# Patient Record
Sex: FEMALE | Race: WHITE | Employment: OTHER | RURAL
[De-identification: names, ages, dates, MRNs, and addresses within clinical notes are randomized per-mention and may not be internally consistent; named-entity substitution may affect disease eponyms.]

---

## 2018-01-05 ENCOUNTER — OFFICE VISIT (OUTPATIENT)
Dept: PRIMARY CARE CLINIC | Age: 78
End: 2018-01-05
Payer: MEDICARE

## 2018-01-05 VITALS
SYSTOLIC BLOOD PRESSURE: 110 MMHG | HEIGHT: 67 IN | HEART RATE: 75 BPM | BODY MASS INDEX: 33.68 KG/M2 | WEIGHT: 214.6 LBS | OXYGEN SATURATION: 97 % | DIASTOLIC BLOOD PRESSURE: 70 MMHG

## 2018-01-05 DIAGNOSIS — J40 BRONCHITIS: Primary | ICD-10-CM

## 2018-01-05 PROCEDURE — 1090F PRES/ABSN URINE INCON ASSESS: CPT | Performed by: NURSE PRACTITIONER

## 2018-01-05 PROCEDURE — 4040F PNEUMOC VAC/ADMIN/RCVD: CPT | Performed by: NURSE PRACTITIONER

## 2018-01-05 PROCEDURE — G8400 PT W/DXA NO RESULTS DOC: HCPCS | Performed by: NURSE PRACTITIONER

## 2018-01-05 PROCEDURE — 1123F ACP DISCUSS/DSCN MKR DOCD: CPT | Performed by: NURSE PRACTITIONER

## 2018-01-05 PROCEDURE — G8427 DOCREV CUR MEDS BY ELIG CLIN: HCPCS | Performed by: NURSE PRACTITIONER

## 2018-01-05 PROCEDURE — 99213 OFFICE O/P EST LOW 20 MIN: CPT | Performed by: NURSE PRACTITIONER

## 2018-01-05 PROCEDURE — G8484 FLU IMMUNIZE NO ADMIN: HCPCS | Performed by: NURSE PRACTITIONER

## 2018-01-05 PROCEDURE — G8417 CALC BMI ABV UP PARAM F/U: HCPCS | Performed by: NURSE PRACTITIONER

## 2018-01-05 PROCEDURE — 1036F TOBACCO NON-USER: CPT | Performed by: NURSE PRACTITIONER

## 2018-01-05 RX ORDER — AZITHROMYCIN 250 MG/1
TABLET, FILM COATED ORAL
Qty: 1 PACKET | Refills: 1 | Status: ON HOLD | OUTPATIENT
Start: 2018-01-05 | End: 2020-06-30

## 2018-01-05 ASSESSMENT — ENCOUNTER SYMPTOMS
SINUS PRESSURE: 1
COUGH: 1

## 2018-01-06 NOTE — PROGRESS NOTES
sounds normal. No respiratory distress. She has no wheezes. Abdominal: Soft. Bowel sounds are normal. She exhibits no distension. There is no tenderness. Musculoskeletal: Normal range of motion. She exhibits no edema. Lymphadenopathy:     She has no cervical adenopathy. Neurological: She is alert and oriented to person, place, and time. Skin: Skin is warm and dry. No rash noted. Psychiatric: She has a normal mood and affect. Her behavior is normal. Thought content normal.   Nursing note and vitals reviewed. Assessment:       Bronchitis      Plan:       I explained to this patient that I felt she needed to have a chest x-ray done to evaluate for possible pneumonia. Patient declines and does not wish to have a chest x-ray done at this point. Patient requests \"can't I just have an antibiotic and try this\". Patient states that she responds well to Zithromax.   Z-Judah as directed

## 2018-11-13 RX ORDER — GABAPENTIN 300 MG/1
300 CAPSULE ORAL DAILY
COMMUNITY

## 2018-11-13 RX ORDER — DOCUSATE SODIUM 100 MG/1
100 CAPSULE, LIQUID FILLED ORAL 2 TIMES DAILY
COMMUNITY
End: 2019-10-10

## 2018-11-13 RX ORDER — ATORVASTATIN CALCIUM 20 MG/1
20 TABLET, FILM COATED ORAL DAILY
COMMUNITY
End: 2019-10-10

## 2019-03-12 ENCOUNTER — HOSPITAL ENCOUNTER (OUTPATIENT)
Dept: GENERAL RADIOLOGY | Facility: HOSPITAL | Age: 79
Discharge: HOME | End: 2019-03-12
Payer: MEDICARE

## 2019-03-12 ENCOUNTER — HOSPITAL ENCOUNTER (OUTPATIENT)
Facility: HOSPITAL | Age: 79
Discharge: HOME | End: 2019-03-12
Payer: MEDICARE
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
## 2019-03-12 PROCEDURE — 73560 X-RAY EXAM OF KNEE 1 OR 2: CPT

## 2019-03-12 PROCEDURE — 73502 X-RAY EXAM HIP UNI 2-3 VIEWS: CPT

## 2019-03-12 PROCEDURE — 73610 X-RAY EXAM OF ANKLE: CPT

## 2019-09-12 ENCOUNTER — OFFICE VISIT (OUTPATIENT)
Dept: SURGERY | Facility: CLINIC | Age: 79
End: 2019-09-12

## 2019-09-12 VITALS
OXYGEN SATURATION: 98 % | SYSTOLIC BLOOD PRESSURE: 116 MMHG | WEIGHT: 209.8 LBS | HEART RATE: 78 BPM | BODY MASS INDEX: 32.93 KG/M2 | HEIGHT: 67 IN | DIASTOLIC BLOOD PRESSURE: 57 MMHG | TEMPERATURE: 97.2 F

## 2019-09-12 DIAGNOSIS — D49.2 SKIN NEOPLASM: Primary | ICD-10-CM

## 2019-09-12 DIAGNOSIS — R19.5 HEME POSITIVE STOOL: ICD-10-CM

## 2019-09-12 PROCEDURE — 99204 OFFICE O/P NEW MOD 45 MIN: CPT | Performed by: SURGERY

## 2019-09-12 RX ORDER — POTASSIUM CHLORIDE 20 MEQ/1
20 TABLET, EXTENDED RELEASE ORAL DAILY
Refills: 5 | COMMUNITY
Start: 2019-08-06

## 2019-09-12 RX ORDER — BISACODYL 5 MG/1
TABLET, DELAYED RELEASE ORAL
Qty: 4 TABLET | Refills: 0 | Status: SHIPPED | OUTPATIENT
Start: 2019-09-12 | End: 2019-10-10

## 2019-09-12 RX ORDER — FUROSEMIDE 40 MG/1
40 TABLET ORAL DAILY
Refills: 5 | COMMUNITY
Start: 2019-08-06

## 2019-09-12 RX ORDER — ALPRAZOLAM 0.5 MG/1
0.5 TABLET ORAL EVERY 6 HOURS PRN
Refills: 0 | COMMUNITY
Start: 2019-08-22 | End: 2019-09-12

## 2019-09-12 RX ORDER — POLYETHYLENE GLYCOL 3350 17 G/17G
POWDER, FOR SOLUTION ORAL
Qty: 238 G | Refills: 0 | Status: SHIPPED | OUTPATIENT
Start: 2019-09-12 | End: 2019-10-10

## 2019-09-12 RX ORDER — CITALOPRAM 10 MG/1
TABLET ORAL
Refills: 2 | COMMUNITY
Start: 2019-08-28 | End: 2020-03-12

## 2019-09-12 NOTE — PROGRESS NOTES
Patient: Lashawn Martino    YOB: 1940    Date: 09/12/2019    Primary Care Provider: Luz Maria Espitia APRN    Chief Complaint   Patient presents with   • Skin Lesion     nose   • Rectal Bleeding       SUBJECTIVE:    History of present illness:  Patient is in the office today for evaluation and consultation for skin lesion on nose, onset 9/2018. Patient also states recent cologuard was completed that resulted positive. Patient states history of hemorrhoids, diarrhea, and constipation.   Patient denies visible rectal bleeding, last colonoscopy over 5 years ago.  No family history of colon cancer.  No weight loss or anemia.    The following portions of the patient's history were reviewed and updated as appropriate: allergies, current medications, past family history, past medical history, past social history, past surgical history and problem list.       Review of Systems   Constitutional: Negative for chills, diaphoresis, fatigue and fever.   HENT: Negative for dental problem, drooling, ear discharge and hearing loss.    Respiratory: Negative for cough, choking, chest tightness and shortness of breath.    Cardiovascular: Negative for chest pain, palpitations and leg swelling.   Gastrointestinal: Positive for blood in stool, constipation and diarrhea.   Endocrine: Negative for cold intolerance and heat intolerance.   Genitourinary: Negative for flank pain, frequency and hematuria.   Skin: Positive for color change.   Neurological: Negative for seizures, light-headedness, numbness and headaches.   Psychiatric/Behavioral: Negative for agitation, behavioral problems and confusion.       Allergies:  Allergies   Allergen Reactions   • Penicillins Other (See Comments)     Pt not aware of the reactions, hasn't taken it for years   • Tetanus Toxoids Palpitations       Medications:    Current Outpatient Medications:   •  atorvastatin (LIPITOR) 20 MG tablet, Take 20 mg by mouth Daily., Disp: , Rfl:   •   "Azilsartan-Chlorthalidone 40-12.5 MG tablet, Take  by mouth., Disp: , Rfl:   •  bisacodyl (DULCOLAX) 5 MG EC tablet, Clear liquid diet all day. 2 tablets at 3pm and 2 tablets at 7pm., Disp: 4 tablet, Rfl: 0  •  citalopram (CeleXA) 10 MG tablet, TAKE ONE TABLET BY MOUTH EVERY DAY (taper THE zoloft as directed planned), Disp: , Rfl: 2  •  docusate sodium (COLACE) 100 MG capsule, Take 100 mg by mouth 2 (Two) Times a Day., Disp: , Rfl:   •  furosemide (LASIX) 40 MG tablet, Take 40 mg by mouth Daily As Needed., Disp: , Rfl: 5  •  gabapentin (NEURONTIN) 300 MG capsule, Take 300 mg by mouth 3 (Three) Times a Day., Disp: , Rfl:   •  polyethylene glycol (GLYCOLAX) powder, Mix 238g powder with 64 oz of clear liquid. Starting at 5pm drink 8oz every 10-15 min until consumed., Disp: 238 g, Rfl: 0  •  potassium chloride (K-DUR,KLOR-CON) 20 MEQ CR tablet, TAKE ONE TABLET BY MOUTH daily. take AS NEEDED with THE lasix, Disp: , Rfl: 5    History:  Past Medical History:   Diagnosis Date   • Arthritis    • Fibromyalgia    • Hypertension    • Osteoporosis        Past Surgical History:   Procedure Laterality Date   • BACK SURGERY     • HYSTERECTOMY     • JOINT REPLACEMENT Left     knee   • PACEMAKER IMPLANTATION     • SHOULDER SURGERY Right        History reviewed. No pertinent family history.    Social History     Tobacco Use   • Smoking status: Never Smoker   • Smokeless tobacco: Never Used   Substance Use Topics   • Alcohol use: No     Frequency: Never   • Drug use: No        OBJECTIVE:    Vital Signs:   Vitals:    09/12/19 1006   BP: 116/57   Pulse: 78   Temp: 97.2 °F (36.2 °C)   SpO2: 98%   Weight: 95.2 kg (209 lb 12.8 oz)   Height: 170.2 cm (67\")       Physical Exam:   General Appearance:    Alert, cooperative, in no acute distress   Head:    Normocephalic, without obvious abnormality, atraumatic.  Right side of nose with a small biopsy-proven basal cell carcinoma.   Eyes:            Lids and lashes normal, conjunctivae and sclerae " normal, no   icterus, no pallor, corneas clear, PERRLA   Ears:    Ears appear intact with no abnormalities noted   Throat:   No oral lesions, no thrush, oral mucosa moist   Neck:   No adenopathy, supple, trachea midline, no thyromegaly, no   carotid bruit, no JVD   Lungs:     Clear to auscultation,respirations regular, even and                  unlabored    Heart:    Regular rhythm and normal rate, normal S1 and S2, no            murmur, no gallop, no rub, no click   Chest Wall:    No abnormalities observed   Abdomen:     Normal bowel sounds, no masses, no organomegaly, soft        non-tender, non-distended, no guarding, no rebound                tenderness   Extremities:   Moves all extremities well, no edema, no cyanosis, no             redness   Pulses:   Pulses palpable and equal bilaterally   Skin:   No bleeding, bruising or rash   Lymph nodes:   No palpable adenopathy   Neurologic:   Cranial nerves 2 - 12 grossly intact, sensation intact, DTR       present and equal bilaterally     Results Review:   I reviewed the patient's new clinical results.    ASSESSMENT/PLAN:    1. Skin neoplasm    2. Heme positive stool        Scheduled for excision of skin lesion and cancer on the right nose next week.  Also scheduled for colonoscopy in 3 weeks to evaluate her positive Cologuard test.  Risk of bleeding perforation discussed and patient and daughter agreeable.  Risk of bleeding infection and recurrence of the skin lesion also discussed and patient agreeable    I discussed the patients findings and my recommendations with patient    Review of Systems was reviewed and confirmed as accurate today.    Electronically signed by Eileen Amezcua MD  09/12/19      .

## 2019-10-03 ENCOUNTER — HOSPITAL ENCOUNTER (OUTPATIENT)
Facility: HOSPITAL | Age: 79
Setting detail: OUTPATIENT SURGERY
Discharge: HOME | End: 2019-10-03
Attending: SURGERY | Admitting: SURGERY
Payer: MEDICARE

## 2019-10-03 ENCOUNTER — ANESTHESIA EVENT (OUTPATIENT)
Dept: ENDOSCOPY | Facility: HOSPITAL | Age: 79
End: 2019-10-03
Payer: MEDICARE

## 2019-10-03 ENCOUNTER — OUTSIDE FACILITY SERVICE (OUTPATIENT)
Dept: SURGERY | Facility: CLINIC | Age: 79
End: 2019-10-03

## 2019-10-03 ENCOUNTER — ANESTHESIA (OUTPATIENT)
Dept: ENDOSCOPY | Facility: HOSPITAL | Age: 79
End: 2019-10-03
Payer: MEDICARE

## 2019-10-03 VITALS
SYSTOLIC BLOOD PRESSURE: 124 MMHG | BODY MASS INDEX: 31.83 KG/M2 | RESPIRATION RATE: 20 BRPM | HEIGHT: 68 IN | OXYGEN SATURATION: 98 % | WEIGHT: 210 LBS | DIASTOLIC BLOOD PRESSURE: 57 MMHG | HEART RATE: 84 BPM | TEMPERATURE: 98.3 F

## 2019-10-03 VITALS
DIASTOLIC BLOOD PRESSURE: 62 MMHG | SYSTOLIC BLOOD PRESSURE: 120 MMHG | RESPIRATION RATE: 19 BRPM | OXYGEN SATURATION: 100 %

## 2019-10-03 PROCEDURE — 3700000001 HC ADD 15 MINUTES (ANESTHESIA): Performed by: SURGERY

## 2019-10-03 PROCEDURE — 6360000002 HC RX W HCPCS: Performed by: NURSE ANESTHETIST, CERTIFIED REGISTERED

## 2019-10-03 PROCEDURE — 2709999900 HC NON-CHARGEABLE SUPPLY: Performed by: SURGERY

## 2019-10-03 PROCEDURE — 7100000011 HC PHASE II RECOVERY - ADDTL 15 MIN: Performed by: SURGERY

## 2019-10-03 PROCEDURE — 3700000000 HC ANESTHESIA ATTENDED CARE: Performed by: SURGERY

## 2019-10-03 PROCEDURE — 45380 COLONOSCOPY AND BIOPSY: CPT | Performed by: SURGERY

## 2019-10-03 PROCEDURE — 7100000010 HC PHASE II RECOVERY - FIRST 15 MIN: Performed by: SURGERY

## 2019-10-03 PROCEDURE — 45384 COLONOSCOPY W/LESION REMOVAL: CPT | Performed by: SURGERY

## 2019-10-03 PROCEDURE — 2580000003 HC RX 258: Performed by: SURGERY

## 2019-10-03 PROCEDURE — 2500000003 HC RX 250 WO HCPCS: Performed by: NURSE ANESTHETIST, CERTIFIED REGISTERED

## 2019-10-03 PROCEDURE — 3609010400 HC COLONOSCOPY POLYPECTOMY HOT BIOPSY

## 2019-10-03 PROCEDURE — 3609010600 HC COLONOSCOPY POLYPECTOMY SNARE/COLD BIOPSY: Performed by: SURGERY

## 2019-10-03 RX ORDER — PROPOFOL 10 MG/ML
INJECTION, EMULSION INTRAVENOUS PRN
Status: DISCONTINUED | OUTPATIENT
Start: 2019-10-03 | End: 2019-10-03 | Stop reason: SDUPTHER

## 2019-10-03 RX ORDER — SODIUM CHLORIDE, SODIUM LACTATE, POTASSIUM CHLORIDE, CALCIUM CHLORIDE 600; 310; 30; 20 MG/100ML; MG/100ML; MG/100ML; MG/100ML
INJECTION, SOLUTION INTRAVENOUS CONTINUOUS
Status: DISCONTINUED | OUTPATIENT
Start: 2019-10-03 | End: 2019-10-03 | Stop reason: SDUPTHER

## 2019-10-03 RX ORDER — SODIUM CHLORIDE, SODIUM LACTATE, POTASSIUM CHLORIDE, CALCIUM CHLORIDE 600; 310; 30; 20 MG/100ML; MG/100ML; MG/100ML; MG/100ML
INJECTION, SOLUTION INTRAVENOUS CONTINUOUS
Status: DISCONTINUED | OUTPATIENT
Start: 2019-10-03 | End: 2019-10-03 | Stop reason: HOSPADM

## 2019-10-03 RX ADMIN — LIDOCAINE HYDROCHLORIDE 20 MG: 10 INJECTION, SOLUTION INFILTRATION; PERINEURAL at 09:35

## 2019-10-03 RX ADMIN — PROPOFOL 30 MG: 10 INJECTION, EMULSION INTRAVENOUS at 09:39

## 2019-10-03 RX ADMIN — SODIUM CHLORIDE, POTASSIUM CHLORIDE, SODIUM LACTATE AND CALCIUM CHLORIDE: 600; 310; 30; 20 INJECTION, SOLUTION INTRAVENOUS at 08:55

## 2019-10-03 RX ADMIN — PROPOFOL 30 MG: 10 INJECTION, EMULSION INTRAVENOUS at 09:35

## 2019-10-03 RX ADMIN — PROPOFOL 40 MG: 10 INJECTION, EMULSION INTRAVENOUS at 09:50

## 2019-10-03 RX ADMIN — PROPOFOL 40 MG: 10 INJECTION, EMULSION INTRAVENOUS at 09:46

## 2019-10-03 RX ADMIN — PROPOFOL 40 MG: 10 INJECTION, EMULSION INTRAVENOUS at 09:42

## 2019-10-03 ASSESSMENT — ENCOUNTER SYMPTOMS: SHORTNESS OF BREATH: 1

## 2019-10-10 ENCOUNTER — HOSPITAL ENCOUNTER (OUTPATIENT)
Facility: HOSPITAL | Age: 79
Discharge: HOME | End: 2019-10-10
Payer: MEDICARE

## 2019-10-10 ENCOUNTER — HOSPITAL ENCOUNTER (OUTPATIENT)
Dept: GENERAL RADIOLOGY | Facility: HOSPITAL | Age: 79
Discharge: HOME | End: 2019-10-10
Payer: MEDICARE

## 2019-10-10 ENCOUNTER — CONSULT (OUTPATIENT)
Dept: CARDIOLOGY | Facility: CLINIC | Age: 79
End: 2019-10-10

## 2019-10-10 VITALS
SYSTOLIC BLOOD PRESSURE: 112 MMHG | DIASTOLIC BLOOD PRESSURE: 62 MMHG | HEIGHT: 67 IN | WEIGHT: 209 LBS | BODY MASS INDEX: 32.8 KG/M2 | OXYGEN SATURATION: 95 % | HEART RATE: 94 BPM

## 2019-10-10 DIAGNOSIS — Z91.81 STATUS POST FALL: ICD-10-CM

## 2019-10-10 DIAGNOSIS — I49.5 SSS (SICK SINUS SYNDROME) (HCC): ICD-10-CM

## 2019-10-10 DIAGNOSIS — I10 ESSENTIAL HYPERTENSION: ICD-10-CM

## 2019-10-10 DIAGNOSIS — R07.81 RIB PAIN ON LEFT SIDE: ICD-10-CM

## 2019-10-10 DIAGNOSIS — R42 DIZZINESS: Primary | ICD-10-CM

## 2019-10-10 LAB
A/G RATIO: 1.4 (ref 0.8–2)
ALBUMIN SERPL-MCNC: 4.5 G/DL (ref 3.4–4.8)
ALP BLD-CCNC: 93 U/L (ref 25–100)
ALT SERPL-CCNC: 12 U/L (ref 4–36)
ANION GAP SERPL CALCULATED.3IONS-SCNC: 18 MMOL/L (ref 3–16)
AST SERPL-CCNC: 17 U/L (ref 8–33)
BASOPHILS ABSOLUTE: 0 K/UL (ref 0–0.1)
BASOPHILS RELATIVE PERCENT: 0.5 %
BILIRUB SERPL-MCNC: <0.2 MG/DL (ref 0.3–1.2)
BUN BLDV-MCNC: 43 MG/DL (ref 6–20)
CALCIUM SERPL-MCNC: 10.2 MG/DL (ref 8.5–10.5)
CHLORIDE BLD-SCNC: 95 MMOL/L (ref 98–107)
CO2: 26 MMOL/L (ref 20–30)
CREAT SERPL-MCNC: 1.7 MG/DL (ref 0.4–1.2)
EOSINOPHILS ABSOLUTE: 0.1 K/UL (ref 0–0.4)
EOSINOPHILS RELATIVE PERCENT: 1.5 %
GFR AFRICAN AMERICAN: 35
GFR NON-AFRICAN AMERICAN: 29
GLOBULIN: 3.3 G/DL
GLUCOSE BLD-MCNC: 140 MG/DL (ref 74–106)
HCT VFR BLD CALC: 35.7 % (ref 37–47)
HEMOGLOBIN: 11.7 G/DL (ref 11.5–16.5)
IMMATURE GRANULOCYTES #: 0 K/UL
IMMATURE GRANULOCYTES %: 0.3 % (ref 0–5)
LYMPHOCYTES ABSOLUTE: 2 K/UL (ref 1.5–4)
LYMPHOCYTES RELATIVE PERCENT: 26.1 %
MCH RBC QN AUTO: 30.7 PG (ref 27–32)
MCHC RBC AUTO-ENTMCNC: 32.8 G/DL (ref 31–35)
MCV RBC AUTO: 93.7 FL (ref 80–100)
MONOCYTES ABSOLUTE: 0.4 K/UL (ref 0.2–0.8)
MONOCYTES RELATIVE PERCENT: 5.4 %
NEUTROPHILS ABSOLUTE: 5.2 K/UL (ref 2–7.5)
NEUTROPHILS RELATIVE PERCENT: 66.2 %
PDW BLD-RTO: 13.9 % (ref 11–16)
PLATELET # BLD: 216 K/UL (ref 150–400)
PMV BLD AUTO: 10 FL (ref 6–10)
POTASSIUM SERPL-SCNC: 4.2 MMOL/L (ref 3.4–5.1)
RBC # BLD: 3.81 M/UL (ref 3.8–5.8)
SODIUM BLD-SCNC: 139 MMOL/L (ref 136–145)
TOTAL PROTEIN: 7.8 G/DL (ref 6.4–8.3)
WBC # BLD: 7.8 K/UL (ref 4–11)

## 2019-10-10 PROCEDURE — 71101 X-RAY EXAM UNILAT RIBS/CHEST: CPT

## 2019-10-10 PROCEDURE — 36415 COLL VENOUS BLD VENIPUNCTURE: CPT

## 2019-10-10 PROCEDURE — 99204 OFFICE O/P NEW MOD 45 MIN: CPT | Performed by: INTERNAL MEDICINE

## 2019-10-10 PROCEDURE — 80053 COMPREHEN METABOLIC PANEL: CPT

## 2019-10-10 PROCEDURE — 93005 ELECTROCARDIOGRAM TRACING: CPT

## 2019-10-10 PROCEDURE — 93000 ELECTROCARDIOGRAM COMPLETE: CPT | Performed by: INTERNAL MEDICINE

## 2019-10-10 PROCEDURE — 70150 X-RAY EXAM OF FACIAL BONES: CPT

## 2019-10-10 PROCEDURE — 71046 X-RAY EXAM CHEST 2 VIEWS: CPT

## 2019-10-10 PROCEDURE — 85025 COMPLETE CBC W/AUTO DIFF WBC: CPT

## 2019-10-10 RX ORDER — ASPIRIN 81 MG/1
81 TABLET ORAL DAILY
COMMUNITY

## 2019-10-10 RX ORDER — OMEPRAZOLE 20 MG/1
20 CAPSULE, DELAYED RELEASE ORAL DAILY
COMMUNITY

## 2019-10-10 RX ORDER — MELATONIN
50 DAILY
COMMUNITY

## 2019-10-10 RX ORDER — DULOXETIN HYDROCHLORIDE 60 MG/1
60 CAPSULE, DELAYED RELEASE ORAL DAILY
COMMUNITY

## 2019-10-10 NOTE — PROGRESS NOTES
[facility] at [facility]  Consultation H&P  Lashawndavid Martino  1940    There is no work phone number on file..    VISIT DATE:  10/10/2019    PCP: Luz Maria Espitia, APRN  [address] [facility] [address]    CC:  Chief Complaint   Patient presents with   • Palpitations   • Dizziness   • Hyperlipidemia     Previous cardiac studies and procedures:  October 2014 Malinda scan myocardial perfusion imaging   1.  Adequate stress test.    2.  No evidence for inducible ischemia on perfusion images during Lexiscan    stress.    3.  Normal LV systolic function (EF 66%) with normal left ventricular    end-diastolic volume.     ASSESSMENT:   Diagnosis Plan   1. Dizziness     2. Essential hypertension     3. SSS (sick sinus syndrome) (CMS/[facility])           PLAN:  Sick sinus syndrome: I requested  device records, once received will arrange for device interrogation in the near future.    Hypertension: Goal less than 140/90 mmHg.  Marginal blood pressures today, concerned for potential intermittent symptomatic hypotension placing patient at risk for recurrent falls in the setting of gait instability.  Recommending decreasing his azilsartan/chlorthalidone to 20-6.25 mg p.o. daily.  Continue to keep home blood pressure log.    Edema: Consistent with venous insufficiency.  Currently well controlled on current dose of Lasix.  Continue limiting sodium intake and elevating feet when possible.    Gait instability: Discussed that she would likely benefit from physical therapy focused on lower extremity strength training and balance in order to limit risk of recurrent mechanical falls.    History of Present Illness   79-year-old female with a history of sick sinus syndrome status post permanent pacemaker implantation in approximately 2011, and hypertension, previously followed by Dr. Arreola.  Have requested records from his office.  She has had frequent episodes of lightheadedness and has had a mechanical fall recently with soft  "tissue injury and bruising to her face chest wall and knees.  She was using a cane for ambulation when this happened, currently using a wheeled walker.  Feels unstable on her feet with generalized weakness.  Blood pressures at home appear to be running less than 120/70 mmHg.  She has bilateral lower extremity dependent edema which is well controlled on her current dose of Lasix.    PHYSICAL EXAMINATION:  Vitals:    10/10/19 1458   BP: 112/62   BP Location: Left arm   Patient Position: Sitting   Pulse: 94   SpO2: 95%   Weight: 94.8 kg (209 lb)   Height: 170.2 cm (67\")     General Appearance:    Alert, cooperative, no distress, appears stated age   Head:    Normocephalic, without obvious abnormality, atraumatic   Eyes:    conjunctiva/corneas clear, EOM's intact, fundi     benign, both eyes   Ears:    Normal TM's and external ear canals, both ears   Nose:   Nares normal, septum midline, mucosa normal, no drainage    or sinus tenderness   Throat:   Lips, mucosa, and tongue normal; teeth and gums normal   Neck:   Supple, symmetrical, trachea midline, no adenopathy;     thyroid:  no enlargement/tenderness/nodules; no carotid    bruit or JVD   Back:     Symmetric, no curvature, ROM normal, no CVA tenderness   Lungs:     Clear to auscultation bilaterally, respirations unlabored   Chest Wall:    No tenderness or deformity    Heart:    Regular rate and rhythm, S1 and S2 normal, no murmur, rub   or gallop, normal carotid impulse bilaterally without bruit.   Abdomen:     Soft, non-tender, bowel sounds active all four quadrants,     no masses, no organomegaly   Extremities:   Extremities normal, atraumatic, no cyanosis or edema   Pulses:   2+ and symmetric all extremities   Skin:   Skin color, texture, turgor normal, no rashes or lesions   Lymph nodes:   Cervical, supraclavicular, and axillary nodes normal   Neurologic:   normal strength, sensation intact     throughout       Diagnostic Data:    ECG 12 Lead  Date/Time: " 10/10/2019 3:10 PM  Performed by: Len Zavala III, MD  Authorized by: Len Zavala III, MD   Previous ECG: no previous ECG available  Rhythm: sinus rhythm    Clinical impression: normal ECG          No results found for: CHLPL, TRIG, HDL, LDLDIRECT  Lab Results   Component Value Date    BUN 17 04/15/2014    CREATININE 0.9 04/15/2014     04/15/2014    K 4.6 04/15/2014    CL 99 04/15/2014    CO2 30 04/15/2014     No results found for: HGBA1C  Lab Results   Component Value Date    WBC 6.9 04/15/2014    HGB 14.2 04/15/2014    HCT 42 04/15/2014     04/15/2014       PROBLEM LIST:  Patient Active Problem List   Diagnosis   • Dizziness   • Essential hypertension   • SSS (sick sinus syndrome) (CMS/[facility])       PAST MEDICAL HX  Past Medical History:   Diagnosis Date   • Arthritis    • Fibromyalgia    • Hypertension    • Osteoporosis        Allergies  Allergies   Allergen Reactions   • Penicillins Other (See Comments)     Pt not aware of the reactions, hasn't taken it for years   • Tetanus Toxoids Palpitations       Current Medications    Current Outpatient Medications:   •  aspirin 81 MG EC tablet, Take 81 mg by mouth Daily., Disp: , Rfl:   •  Azilsartan-Chlorthalidone 40-12.5 MG tablet, Take 0.5 tablets by mouth Daily., Disp: , Rfl:   •  cholecalciferol (VITAMIN D3) 1000 units tablet, Take 50 Units by mouth Daily., Disp: , Rfl:   •  citalopram (CeleXA) 10 MG tablet, TAKE ONE TABLET BY MOUTH EVERY DAY (taper THE zoloft as directed planned) 20mg qd, Disp: , Rfl: 2  •  DULoxetine (CYMBALTA) 60 MG capsule, Take 60 mg by mouth Daily., Disp: , Rfl:   •  furosemide (LASIX) 40 MG tablet, Take 40 mg by mouth Daily., Disp: , Rfl: 5  •  gabapentin (NEURONTIN) 300 MG capsule, Take 300 mg by mouth Daily., Disp: , Rfl:   •  Multiple Vitamin (MULTI-DAY PO), Take  by mouth Daily., Disp: , Rfl:   •  omeprazole (priLOSEC) 20 MG capsule, Take 20 mg by mouth Daily., Disp: , Rfl:   •  potassium chloride (K-DUR,KLOR-CON) 20 MEQ CR  tablet, TAKE ONE TABLET BY MOUTH daily. take AS NEEDED with THE lasix, Disp: , Rfl: 5         ROS  Review of Systems   Cardiovascular: Positive for dyspnea on exertion and leg swelling.   Respiratory: Positive for shortness of breath.    Neurological: Positive for light-headedness.     All other body systems reviewed and are negative    SOCIAL HX  Social History     Socioeconomic History   • Marital status: Unknown     Spouse name: Not on file   • Number of children: Not on file   • Years of education: Not on file   • Highest education level: Not on file   Tobacco Use   • Smoking status: Never Smoker   • Smokeless tobacco: Never Used   Substance and Sexual Activity   • Alcohol use: No     Frequency: Never   • Drug use: No   • Sexual activity: Defer       FAMILY HX  History reviewed. No pertinent family history.          Len Zavala III, MD, FACC

## 2019-10-24 ENCOUNTER — OFFICE VISIT (OUTPATIENT)
Dept: CARDIOLOGY | Facility: CLINIC | Age: 79
End: 2019-10-24

## 2019-10-24 VITALS
SYSTOLIC BLOOD PRESSURE: 110 MMHG | HEIGHT: 68 IN | HEART RATE: 66 BPM | DIASTOLIC BLOOD PRESSURE: 62 MMHG | WEIGHT: 206.8 LBS | BODY MASS INDEX: 31.34 KG/M2 | OXYGEN SATURATION: 96 %

## 2019-10-24 DIAGNOSIS — I49.5 SSS (SICK SINUS SYNDROME) (HCC): Primary | ICD-10-CM

## 2019-10-24 DIAGNOSIS — I10 ESSENTIAL HYPERTENSION: ICD-10-CM

## 2019-10-24 PROCEDURE — 99214 OFFICE O/P EST MOD 30 MIN: CPT | Performed by: INTERNAL MEDICINE

## 2019-10-24 PROCEDURE — 93280 PM DEVICE PROGR EVAL DUAL: CPT | Performed by: INTERNAL MEDICINE

## 2019-10-24 RX ORDER — PROPRANOLOL HCL 60 MG
60 CAPSULE, EXTENDED RELEASE 24HR ORAL DAILY
Qty: 30 CAPSULE | Refills: 5 | Status: SHIPPED | OUTPATIENT
Start: 2019-10-24

## 2019-10-24 NOTE — PROGRESS NOTES
[facility] at [facility]  Office visit  Lashawn Martino  1940    There is no work phone number on file.    VISIT DATE:  10/24/2019    PCP: Luz Maria Espitia, APRN  [address] [facility] [address]    CC:  Chief Complaint   Patient presents with   • SSS (sick sinus syndrome)   • Dizziness   • Fatigue       Previous cardiac studies and procedures:  October 2014 Malinda scan myocardial perfusion imaging   1.  Adequate stress test.    2.  No evidence for inducible ischemia on perfusion images during Lexiscan    stress.    3.  Normal LV systolic function (EF 66%) with normal left ventricular    end-diastolic volume.   ASSESSMENT:   Diagnosis Plan   1. SSS (sick sinus syndrome) (CMS/HCC)     2. Essential hypertension       [facility] dual-chamber  Since P wave 4.2 mV, threshold 0.6 V at 0.4 ms, impedance 400 ohms  Less 1% RV pacing, sensed R waves 7.3 mV, threshold 0.7 V at 0.4 ms, impedance 480 ohms  Estimated battery life 11 months    PLAN:  Sick sinus syndrome: Continue routine follow-up in device clinic, will arrange for home monitoring.    Hypertension: Goal less than 140/90 mmHg.    Still with overall marginal blood pressures for patient's age and level frailty.  Some of her sensation of tremulousness may be due to underlying developing essential tremor.  Recommending a trial discontinuation of ARB/HCTZ and switching to propranolol.  Will trend sensation of tremulousness and home blood pressure log.    Edema: Consistent with venous insufficiency.  Currently well controlled on current dose of Lasix.  Continue limiting sodium intake and elevating feet when possible.     Gait instability: Discussed that she would likely benefit from physical therapy focused on lower extremity strength training and balance in order to limit risk of recurrent mechanical falls.    Subjective  Interval assessment: Lower extremity edema is well controlled.  Blood pressures are running less than 115/65 mmHg.  Episodes of  "lightheadedness have improved.  She still feels tremulous and weak.    Initial evaluation:79-year-old female with a history of sick sinus syndrome status post permanent pacemaker implantation in approximately 2011, and hypertension, previously followed by Dr. Arreola.  Have requested records from his office.  She has had frequent episodes of lightheadedness and has had a mechanical fall recently with soft tissue injury and bruising to her face chest wall and knees.  She was using a cane for ambulation when this happened, currently using a wheeled walker.  Feels unstable on her feet with generalized weakness.  Blood pressures at home appear to be running less than 120/70 mmHg.  She has bilateral lower extremity dependent edema which is well controlled on her current dose of Lasix.    PHYSICAL EXAMINATION:  Vitals:    10/24/19 1047   BP: 110/62   BP Location: Right arm   Patient Position: Sitting   Pulse: 66   SpO2: 96%   Weight: 93.8 kg (206 lb 12.8 oz)   Height: 172.7 cm (68\")     General Appearance:    Alert, cooperative, no distress, appears stated age   Head:    Normocephalic, without obvious abnormality, atraumatic   Eyes:    conjunctiva/corneas clear   Nose:   Nares normal, septum midline, mucosa normal, no drainage   Throat:   Lips, teeth and gums normal   Neck:   Supple, symmetrical, trachea midline, no carotid    bruit or JVD   Lungs:     Clear to auscultation bilaterally, respirations unlabored   Chest Wall:    No tenderness or deformity    Heart:    Regular rate and rhythm, S1 and S2 normal, no murmur, rub   or gallop, normal carotid impulse bilaterally without bruit.   Abdomen:     Soft, non-tender   Extremities:   Extremities normal, atraumatic, no cyanosis or edema   Pulses:   2+ and symmetric all extremities   Skin:   Skin color, texture, turgor normal, no rashes or lesions       Diagnostic Data:  Procedures  No results found for: CHLPL, TRIG, HDL, LDLDIRECT  Lab Results   Component Value Date    BUN 17 " 04/15/2014    CREATININE 0.9 04/15/2014     04/15/2014    K 4.6 04/15/2014    CL 99 04/15/2014    CO2 30 04/15/2014     No results found for: HGBA1C  Lab Results   Component Value Date    WBC 7.8 10/10/2019    HGB 11.7 10/10/2019    HCT 35.7 (L) 10/10/2019     10/10/2019       Allergies  Allergies   Allergen Reactions   • Penicillins Other (See Comments)     Pt not aware of the reactions, hasn't taken it for years   • Tetanus Toxoids Palpitations       Current Medications    Current Outpatient Medications:   •  aspirin 81 MG EC tablet, Take 81 mg by mouth Daily., Disp: , Rfl:   •  Azilsartan-Chlorthalidone 40-12.5 MG tablet, Take 0.5 tablets by mouth Daily., Disp: , Rfl:   •  cholecalciferol (VITAMIN D3) 1000 units tablet, Take 50 Units by mouth Daily., Disp: , Rfl:   •  citalopram (CeleXA) 10 MG tablet, TAKE ONE TABLET BY MOUTH EVERY DAY (taper THE zoloft as directed planned) 20mg qd, Disp: , Rfl: 2  •  DULoxetine (CYMBALTA) 60 MG capsule, Take 60 mg by mouth Daily., Disp: , Rfl:   •  furosemide (LASIX) 40 MG tablet, Take 40 mg by mouth Daily., Disp: , Rfl: 5  •  gabapentin (NEURONTIN) 300 MG capsule, Take 300 mg by mouth Daily., Disp: , Rfl:   •  Multiple Vitamins-Minerals (MULTIVITAMIN GUMMIES ADULT PO), Take 1 tablet by mouth Daily., Disp: , Rfl:   •  omeprazole (priLOSEC) 20 MG capsule, Take 20 mg by mouth Daily., Disp: , Rfl:   •  potassium chloride (K-DUR,KLOR-CON) 20 MEQ CR tablet, TAKE ONE TABLET BY MOUTH daily. take AS NEEDED with THE lasix, Disp: , Rfl: 5          ROS  Review of Systems   Cardiovascular: Negative for chest pain, dyspnea on exertion and irregular heartbeat.   Respiratory: Negative for cough and shortness of breath.    Neurological: Positive for light-headedness.         SOCIAL HX  Social History     Socioeconomic History   • Marital status: Unknown     Spouse name: Not on file   • Number of children: Not on file   • Years of education: Not on file   • Highest education level:  Not on file   Tobacco Use   • Smoking status: Never Smoker   • Smokeless tobacco: Never Used   Substance and Sexual Activity   • Alcohol use: No     Frequency: Never   • Drug use: No   • Sexual activity: Defer       FAMILY HX  History reviewed. No pertinent family history.          Len Zavala III, MD, FACC

## 2019-10-28 ENCOUNTER — TELEPHONE (OUTPATIENT)
Dept: CARDIOLOGY | Facility: CLINIC | Age: 79
End: 2019-10-28

## 2019-10-28 NOTE — TELEPHONE ENCOUNTER
Dr Zavala wants pt set up with [employer] [facility] monitoring.  Called Dr Arreola's office for pt to be released- left message with .  Will wait for him to release her to see if monitor is connecting and reading.

## 2019-10-29 ENCOUNTER — TELEPHONE (OUTPATIENT)
Dept: CARDIOLOGY | Facility: CLINIC | Age: 79
End: 2019-10-29

## 2019-10-29 NOTE — TELEPHONE ENCOUNTER
[facility] [facility] monitoring has been switched to Dr Zavala's clinic.  Monitor is not connecting or reading.  Left message for pt to return my call so we can get her [employer] home monitor working.      10/30/19- called daughter again today and going to send cellular adapter.  Had spoke to [employer] and they said since monitor hadn't connected since 2017 it may or may not work.  Daughter will call after she receives adapter and will check for connection.  If not working [facility] will assist pt in getting a new monitor.  Daughter verbalized understanding.

## 2019-11-07 ENCOUNTER — TELEPHONE (OUTPATIENT)
Dept: CARDIOLOGY | Facility: CLINIC | Age: 79
End: 2019-11-07

## 2019-11-07 NOTE — TELEPHONE ENCOUNTER
[employer] home monitor is connecting but not reading device.  Gave [employer] # to  and he will call to see if they need a new monitor.

## 2019-11-13 ENCOUNTER — CLINICAL SUPPORT NO REQUIREMENTS (OUTPATIENT)
Dept: CARDIOLOGY | Facility: CLINIC | Age: 79
End: 2019-11-13

## 2019-11-13 DIAGNOSIS — I49.5 SSS (SICK SINUS SYNDROME) (HCC): Primary | ICD-10-CM

## 2019-11-13 PROCEDURE — 93294 REM INTERROG EVL PM/LDLS PM: CPT | Performed by: INTERNAL MEDICINE

## 2019-11-13 PROCEDURE — 93296 REM INTERROG EVL PM/IDS: CPT | Performed by: INTERNAL MEDICINE

## 2020-02-25 ENCOUNTER — HOSPITAL ENCOUNTER (OUTPATIENT)
Facility: HOSPITAL | Age: 80
Discharge: HOME | End: 2020-02-25
Payer: MEDICARE

## 2020-02-25 LAB
BASOPHILS ABSOLUTE: 0 K/UL (ref 0–0.1)
BASOPHILS RELATIVE PERCENT: 0.4 %
EOSINOPHILS ABSOLUTE: 0.1 K/UL (ref 0–0.4)
EOSINOPHILS RELATIVE PERCENT: 1.3 %
HBA1C MFR BLD: 5.9 %
HCT VFR BLD CALC: 41.3 % (ref 37–47)
HEMOGLOBIN: 13.1 G/DL (ref 11.5–16.5)
IMMATURE GRANULOCYTES #: 0 K/UL
IMMATURE GRANULOCYTES %: 0.2 % (ref 0–5)
LYMPHOCYTES ABSOLUTE: 2.5 K/UL (ref 1.5–4)
LYMPHOCYTES RELATIVE PERCENT: 28.8 %
MCH RBC QN AUTO: 29.9 PG (ref 27–32)
MCHC RBC AUTO-ENTMCNC: 31.7 G/DL (ref 31–35)
MCV RBC AUTO: 94.3 FL (ref 80–100)
MONOCYTES ABSOLUTE: 0.5 K/UL (ref 0.2–0.8)
MONOCYTES RELATIVE PERCENT: 6.2 %
NEUTROPHILS ABSOLUTE: 5.4 K/UL (ref 2–7.5)
NEUTROPHILS RELATIVE PERCENT: 63.1 %
PDW BLD-RTO: 13.2 % (ref 11–16)
PLATELET # BLD: 191 K/UL (ref 150–400)
PMV BLD AUTO: 10.5 FL (ref 6–10)
RBC # BLD: 4.38 M/UL (ref 3.8–5.8)
WBC # BLD: 8.5 K/UL (ref 4–11)

## 2020-02-25 PROCEDURE — 36415 COLL VENOUS BLD VENIPUNCTURE: CPT

## 2020-02-25 PROCEDURE — 83036 HEMOGLOBIN GLYCOSYLATED A1C: CPT

## 2020-02-25 PROCEDURE — 85025 COMPLETE CBC W/AUTO DIFF WBC: CPT

## 2020-03-12 ENCOUNTER — OFFICE VISIT (OUTPATIENT)
Dept: CARDIOLOGY | Facility: CLINIC | Age: 80
End: 2020-03-12

## 2020-03-12 VITALS
SYSTOLIC BLOOD PRESSURE: 138 MMHG | DIASTOLIC BLOOD PRESSURE: 7 MMHG | WEIGHT: 210 LBS | BODY MASS INDEX: 32.96 KG/M2 | OXYGEN SATURATION: 97 % | HEIGHT: 67 IN

## 2020-03-12 DIAGNOSIS — I49.5 SSS (SICK SINUS SYNDROME) (HCC): Primary | ICD-10-CM

## 2020-03-12 DIAGNOSIS — I10 ESSENTIAL HYPERTENSION: ICD-10-CM

## 2020-03-12 PROCEDURE — 99213 OFFICE O/P EST LOW 20 MIN: CPT | Performed by: INTERNAL MEDICINE

## 2020-03-12 PROCEDURE — 93280 PM DEVICE PROGR EVAL DUAL: CPT | Performed by: INTERNAL MEDICINE

## 2020-03-12 RX ORDER — CITALOPRAM 20 MG/1
20 TABLET ORAL DAILY
COMMUNITY
Start: 2020-02-15

## 2020-03-12 NOTE — PROGRESS NOTES
[facility] at [facility]  Office visit  Lashawn Martino  1940    There is no work phone number on file.    VISIT DATE:  3/12/2020    PCP: Luz Maria Espitia, APRN  [address] [facility] [address]    CC:  Chief Complaint   Patient presents with   • SSS (sick sinus syndrome)       Previous cardiac studies and procedures:  October 2014 Malinda scan myocardial perfusion imaging   1.  Adequate stress test.    2.  No evidence for inducible ischemia on perfusion images during Lexiscan    stress.    3.  Normal LV systolic function (EF 66%) with normal left ventricular    end-diastolic volume.     ASSESSMENT:   Diagnosis Plan   1. SSS (sick sinus syndrome) (CMS/HCC)     2. Essential hypertension       [facility] dual-chamber  96% atrial pacing, sensed P wave 4.2 mV, threshold 0.62 V at 0.4 ms, impedance 410 ohms  Less 1% RV pacing, sensed R waves 8.5 mV, threshold 0.75 V at 0.4 ms, impedance 480 ohms  Nearing RYAN    PLAN:  Sick sinus syndrome: Repeat device check in 2 weeks.    Hypertension: Goal less than 140/90 mmHg.    Currently well controlled.  Continue current medications.  Tremors improved on propranolol.    Edema: Consistent with venous insufficiency.  Currently well controlled on current dose of Lasix.  Continue limiting sodium intake and elevating feet when possible.       Subjective  Interval assessment: Lower extremity edema is well controlled.  Blood pressures are running less than 140/90 mmHg.  Episodes of lightheadedness have improved.  Tremors have improved on propranolol.    Initial evaluation:79-year-old female with a history of sick sinus syndrome status post permanent pacemaker implantation in approximately 2011, and hypertension, previously followed by Dr. Arreola.  Have requested records from his office.  She has had frequent episodes of lightheadedness and has had a mechanical fall recently with soft tissue injury and bruising to her face chest wall and knees.  She was using a cane for  "ambulation when this happened, currently using a wheeled walker.  Feels unstable on her feet with generalized weakness.  Blood pressures at home appear to be running less than 120/70 mmHg.  She has bilateral lower extremity dependent edema which is well controlled on her current dose of Lasix.    PHYSICAL EXAMINATION:  Vitals:    03/12/20 1307   BP: (!) 138/7   BP Location: Left arm   Patient Position: Sitting   SpO2: 97%   Weight: 95.3 kg (210 lb)   Height: 170.2 cm (67\")     General Appearance:    Alert, cooperative, no distress, appears stated age   Head:    Normocephalic, without obvious abnormality, atraumatic   Eyes:    conjunctiva/corneas clear   Nose:   Nares normal, septum midline, mucosa normal, no drainage   Throat:   Lips, teeth and gums normal   Neck:   Supple, symmetrical, trachea midline, no carotid    bruit or JVD   Lungs:     Clear to auscultation bilaterally, respirations unlabored   Chest Wall:    No tenderness or deformity    Heart:    Regular rate and rhythm, S1 and S2 normal, no murmur, rub   or gallop, normal carotid impulse bilaterally without bruit.   Abdomen:     Soft, non-tender   Extremities:   Extremities normal, atraumatic, no cyanosis or edema   Pulses:   2+ and symmetric all extremities   Skin:   Skin color, texture, turgor normal, no rashes or lesions       Diagnostic Data:  Procedures  No results found for: CHLPL, TRIG, HDL, LDLDIRECT  Lab Results   Component Value Date    BUN 17 04/15/2014    CREATININE 0.9 04/15/2014     04/15/2014    K 4.6 04/15/2014    CL 99 04/15/2014    CO2 30 04/15/2014     Lab Results   Component Value Date    HGBA1C 5.9 02/25/2020     Lab Results   Component Value Date    WBC 8.5 02/25/2020    HGB 13.1 02/25/2020    HCT 41.3 02/25/2020     02/25/2020       Allergies  Allergies   Allergen Reactions   • Penicillins Other (See Comments)     Pt not aware of the reactions, hasn't taken it for years   • Tetanus Toxoids Palpitations       Current " Medications    Current Outpatient Medications:   •  aspirin 81 MG EC tablet, Take 81 mg by mouth Daily., Disp: , Rfl:   •  cholecalciferol (VITAMIN D3) 1000 units tablet, Take 50 Units by mouth Daily., Disp: , Rfl:   •  citalopram (CeleXA) 20 MG tablet, TAKE ONE TABLET BY MOUTH daily DOSE AND STOP THE zoloft, Disp: , Rfl:   •  Docusate Calcium (STOOL SOFTENER PO), Take 1 tablet by mouth Daily., Disp: , Rfl:   •  DULoxetine (CYMBALTA) 60 MG capsule, Take 60 mg by mouth Daily., Disp: , Rfl:   •  furosemide (LASIX) 40 MG tablet, Take 40 mg by mouth Daily., Disp: , Rfl: 5  •  gabapentin (NEURONTIN) 300 MG capsule, Take 300 mg by mouth Daily., Disp: , Rfl:   •  Multiple Vitamins-Minerals (MULTIVITAMIN GUMMIES ADULT PO), Take 1 tablet by mouth Daily., Disp: , Rfl:   •  omeprazole (priLOSEC) 20 MG capsule, Take 20 mg by mouth Daily., Disp: , Rfl:   •  potassium chloride (K-DUR,KLOR-CON) 20 MEQ CR tablet, TAKE ONE TABLET BY MOUTH daily. take AS NEEDED with THE lasix, Disp: , Rfl: 5  •  propranolol LA (INDERAL LA) 60 MG 24 hr capsule, Take 1 capsule by mouth Daily., Disp: 30 capsule, Rfl: 5          ROS  Review of Systems   Cardiovascular: Negative for chest pain, dyspnea on exertion and irregular heartbeat.   Respiratory: Negative for cough and shortness of breath.    Neurological: Positive for light-headedness.         SOCIAL HX  Social History     Socioeconomic History   • Marital status: Unknown     Spouse name: Not on file   • Number of children: Not on file   • Years of education: Not on file   • Highest education level: Not on file   Tobacco Use   • Smoking status: Never Smoker   • Smokeless tobacco: Never Used   Substance and Sexual Activity   • Alcohol use: No     Frequency: Never   • Drug use: No   • Sexual activity: Defer       FAMILY HX  History reviewed. No pertinent family history.          Len Zavala III, MD, [facility]

## 2020-04-09 ENCOUNTER — TELEMEDICINE (OUTPATIENT)
Dept: CARDIOLOGY | Facility: CLINIC | Age: 80
End: 2020-04-09

## 2020-04-09 ENCOUNTER — CLINICAL SUPPORT NO REQUIREMENTS (OUTPATIENT)
Dept: CARDIOLOGY | Facility: CLINIC | Age: 80
End: 2020-04-09

## 2020-04-09 VITALS
SYSTOLIC BLOOD PRESSURE: 159 MMHG | WEIGHT: 204 LBS | DIASTOLIC BLOOD PRESSURE: 80 MMHG | HEART RATE: 78 BPM | HEIGHT: 66 IN | BODY MASS INDEX: 32.78 KG/M2

## 2020-04-09 DIAGNOSIS — I49.5 SSS (SICK SINUS SYNDROME) (HCC): Primary | ICD-10-CM

## 2020-04-09 DIAGNOSIS — I49.5 SSS (SICK SINUS SYNDROME) (HCC): ICD-10-CM

## 2020-04-09 PROCEDURE — 99442 PR PHYS/QHP TELEPHONE EVALUATION 11-20 MIN: CPT | Performed by: INTERNAL MEDICINE

## 2020-04-09 PROCEDURE — 93296 REM INTERROG EVL PM/IDS: CPT | Performed by: INTERNAL MEDICINE

## 2020-04-09 PROCEDURE — 93294 REM INTERROG EVL PM/LDLS PM: CPT | Performed by: INTERNAL MEDICINE

## 2020-04-09 RX ORDER — ALPRAZOLAM 0.5 MG/1
0.5 TABLET ORAL NIGHTLY PRN
COMMUNITY
Start: 2020-03-16

## 2020-04-09 RX ORDER — ACETAMINOPHEN 500 MG
500 TABLET ORAL AS NEEDED
COMMUNITY

## 2020-04-09 NOTE — PROGRESS NOTES
[facility] at [facility]  Office visit  Lashawn Martino  1940    There is no work phone number on file.    VISIT DATE:  4/9/2020    This patient has consented to a telehealth visit via video conferencing. The visit was scheduled as a routine visit to comply with patient safety concerns in accordance with CDC recommendations.  All vitals recorded within this visit are reported by the patient.  I spent 20 minutes in total including but not limited to the 11 minutes spent in direct conversation with this patient.     PCP: Luz Maria Espitia, APRN  [address] [place] [id number]    CC:  Chief Complaint   Patient presents with   • Essential hypertension       Previous cardiac studies and procedures:  October 2014 Malinda scan myocardial perfusion imaging   1.  Adequate stress test.    2.  No evidence for inducible ischemia on perfusion images during Lexiscan    stress.    3.  Normal LV systolic function (EF 66%) with normal left ventricular    end-diastolic volume.     ASSESSMENT:   Diagnosis Plan   1. SSS (sick sinus syndrome) (CMS/[facility])         PLAN:  Sick sinus syndrome: Will trend battery life through remote interrogations.    Hypertension: Goal less than 140/90 mmHg.    Currently well controlled.  Continue current medications.  Tremors improved on propranolol.    Edema: Consistent with venous insufficiency.  Currently well controlled on current dose of Lasix.  Continue limiting sodium intake and elevating feet when possible.       Subjective  Interval assessment: Family reports that lower extremity edema is being well controlled.  Blood pressures are running less than 140/90 mmHg.  They are practicing social distancing.  She has not had a change in her functional capacity since her last visit.    Initial evaluation:79-year-old female with a history of sick sinus syndrome status post permanent pacemaker implantation in approximately 2011, and hypertension, previously followed by Dr. Arreola.  Have  "requested records from his office.  She has had frequent episodes of lightheadedness and has had a mechanical fall recently with soft tissue injury and bruising to her face chest wall and knees.  She was using a cane for ambulation when this happened, currently using a wheeled walker.  Feels unstable on her feet with generalized weakness.  Blood pressures at home appear to be running less than 120/70 mmHg.  She has bilateral lower extremity dependent edema which is well controlled on her current dose of Lasix.    PHYSICAL EXAMINATION:  Vitals:    04/09/20 1055   BP: 159/80   Pulse: 78   Weight: 92.5 kg (204 lb)   Height: 167.6 cm (66\")     General Appearance:    Alert, cooperative, no distress, appears stated age   Head:    Normocephalic, without obvious abnormality, atraumatic   Eyes:    conjunctiva/corneas clear   Nose:   Nares normal, septum midline   Throat:   Lips, teeth  normal   Neck:   symmetrical, trachea midline   Lungs:      Chest Wall:      Heart:     Abdomen:      Extremities:   Extremities normal, atraumatic, no edema   Pulses:      Skin:   Skin color normal, no rashes or lesions       Diagnostic Data:  Procedures  No results found for: CHLPL, TRIG, HDL, LDLDIRECT  Lab Results   Component Value Date    BUN 17 04/15/2014    CREATININE 0.9 04/15/2014     04/15/2014    K 4.6 04/15/2014    CL 99 04/15/2014    CO2 30 04/15/2014     Lab Results   Component Value Date    HGBA1C 5.9 02/25/2020     Lab Results   Component Value Date    WBC 8.5 02/25/2020    HGB 13.1 02/25/2020    HCT 41.3 02/25/2020     02/25/2020       Allergies  Allergies   Allergen Reactions   • Penicillins Other (See Comments)     Pt not aware of the reactions, hasn't taken it for years   • Tetanus Toxoids Palpitations       Current Medications    Current Outpatient Medications:   •  acetaminophen (TYLENOL) 500 MG tablet, Take 500 mg by mouth As Needed for Mild Pain ., Disp: , Rfl:   •  ALPRAZolam (XANAX) 0.5 MG tablet, Take " 0.5 mg by mouth At Night As Needed., Disp: , Rfl:   •  aspirin 81 MG EC tablet, Take 81 mg by mouth Daily., Disp: , Rfl:   •  CBD (cannabidiol) oral oil, Take  by mouth Every Night., Disp: , Rfl:   •  cholecalciferol (VITAMIN D3) 1000 units tablet, Take 50 Units by mouth Daily., Disp: , Rfl:   •  citalopram (CeleXA) 20 MG tablet, TAKE ONE TABLET BY MOUTH daily DOSE AND STOP THE zoloft, Disp: , Rfl:   •  Docusate Calcium (STOOL SOFTENER PO), Take 1 tablet by mouth Daily., Disp: , Rfl:   •  DULoxetine (CYMBALTA) 60 MG capsule, Take 60 mg by mouth Daily., Disp: , Rfl:   •  furosemide (LASIX) 40 MG tablet, Take 40 mg by mouth Daily., Disp: , Rfl: 5  •  gabapentin (NEURONTIN) 300 MG capsule, Take 300 mg by mouth Daily., Disp: , Rfl:   •  Multiple Vitamins-Minerals (MULTIVITAMIN GUMMIES ADULT PO), Take 1 tablet by mouth Daily., Disp: , Rfl:   •  omeprazole (priLOSEC) 20 MG capsule, Take 20 mg by mouth Daily., Disp: , Rfl:   •  potassium chloride (K-DUR,KLOR-CON) 20 MEQ CR tablet, TAKE ONE TABLET BY MOUTH daily. take AS NEEDED with THE lasix, Disp: , Rfl: 5  •  propranolol LA (INDERAL LA) 60 MG 24 hr capsule, Take 1 capsule by mouth Daily., Disp: 30 capsule, Rfl: 5  •  psyllium (METAMUCIL) 58.6 % packet, Take 1 packet by mouth Daily., Disp: , Rfl:           ROS  Review of Systems   Cardiovascular: Negative for chest pain, dyspnea on exertion and irregular heartbeat.   Respiratory: Negative for cough and shortness of breath.    Neurological: Negative for light-headedness.         SOCIAL HX  Social History     Socioeconomic History   • Marital status: Unknown     Spouse name: Not on file   • Number of children: Not on file   • Years of education: Not on file   • Highest education level: Not on file   Tobacco Use   • Smoking status: Never Smoker   • Smokeless tobacco: Never Used   Substance and Sexual Activity   • Alcohol use: No     Frequency: Never   • Drug use: No   • Sexual activity: Defer       FAMILY HX  History  reviewed. No pertinent family history.          Len Zavala III, MD, FACC

## 2020-05-13 ENCOUNTER — TELEPHONE (OUTPATIENT)
Dept: CARDIOLOGY | Facility: CLINIC | Age: 80
End: 2020-05-13

## 2020-05-13 DIAGNOSIS — I49.5 SSS (SICK SINUS SYNDROME) (HCC): Primary | ICD-10-CM

## 2020-05-13 NOTE — TELEPHONE ENCOUNTER
Called pt to let her know her battery is at [facility]. There was no answer so I left my name and number for her to return my call.

## 2020-06-11 ENCOUNTER — PREP FOR SURGERY (OUTPATIENT)
Dept: OTHER | Facility: HOSPITAL | Age: 80
End: 2020-06-11

## 2020-06-11 DIAGNOSIS — I49.5 SSS (SICK SINUS SYNDROME) (HCC): Primary | ICD-10-CM

## 2020-06-11 RX ORDER — SODIUM CHLORIDE 0.9 % (FLUSH) 0.9 %
3 SYRINGE (ML) INJECTION EVERY 12 HOURS SCHEDULED
Status: CANCELLED | OUTPATIENT
Start: 2020-06-11

## 2020-06-11 RX ORDER — SODIUM CHLORIDE 0.9 % (FLUSH) 0.9 %
10 SYRINGE (ML) INJECTION AS NEEDED
Status: CANCELLED | OUTPATIENT
Start: 2020-06-11

## 2020-06-11 RX ORDER — CEFAZOLIN SODIUM 2 G/100ML
2 INJECTION, SOLUTION INTRAVENOUS ONCE
Status: CANCELLED | OUTPATIENT
Start: 2020-06-11 | End: 2020-06-11

## 2020-06-21 ENCOUNTER — APPOINTMENT (OUTPATIENT)
Dept: PREADMISSION TESTING | Facility: HOSPITAL | Age: 80
End: 2020-06-21

## 2020-06-21 DIAGNOSIS — I49.5 SSS (SICK SINUS SYNDROME) (HCC): ICD-10-CM

## 2020-06-21 LAB
ALBUMIN SERPL-MCNC: 4.5 G/DL (ref 3.5–5.2)
ALBUMIN/GLOB SERPL: 1.6 G/DL
ALP SERPL-CCNC: 126 U/L (ref 39–117)
ALT SERPL W P-5'-P-CCNC: 12 U/L (ref 1–33)
ANION GAP SERPL CALCULATED.3IONS-SCNC: 12 MMOL/L (ref 5–15)
AST SERPL-CCNC: 14 U/L (ref 1–32)
BASOPHILS # BLD AUTO: 0.02 10*3/MM3 (ref 0–0.2)
BASOPHILS NFR BLD AUTO: 0.3 % (ref 0–1.5)
BILIRUB SERPL-MCNC: 0.3 MG/DL (ref 0.2–1.2)
BUN BLD-MCNC: 22 MG/DL (ref 8–23)
BUN/CREAT SERPL: 22.7 (ref 7–25)
CALCIUM SPEC-SCNC: 9.6 MG/DL (ref 8.6–10.5)
CHLORIDE SERPL-SCNC: 101 MMOL/L (ref 98–107)
CO2 SERPL-SCNC: 28 MMOL/L (ref 22–29)
CREAT BLD-MCNC: 0.97 MG/DL (ref 0.57–1)
DEPRECATED RDW RBC AUTO: 45.4 FL (ref 37–54)
EOSINOPHIL # BLD AUTO: 0.11 10*3/MM3 (ref 0–0.4)
EOSINOPHIL NFR BLD AUTO: 1.4 % (ref 0.3–6.2)
ERYTHROCYTE [DISTWIDTH] IN BLOOD BY AUTOMATED COUNT: 12.8 % (ref 12.3–15.4)
GFR SERPL CREATININE-BSD FRML MDRD: 55 ML/MIN/1.73
GLOBULIN UR ELPH-MCNC: 2.9 GM/DL
GLUCOSE BLD-MCNC: 132 MG/DL (ref 65–99)
HCT VFR BLD AUTO: 42.1 % (ref 34–46.6)
HGB BLD-MCNC: 13.7 G/DL (ref 12–15.9)
IMM GRANULOCYTES # BLD AUTO: 0.01 10*3/MM3 (ref 0–0.05)
IMM GRANULOCYTES NFR BLD AUTO: 0.1 % (ref 0–0.5)
LYMPHOCYTES # BLD AUTO: 2.13 10*3/MM3 (ref 0.7–3.1)
LYMPHOCYTES NFR BLD AUTO: 27.7 % (ref 19.6–45.3)
MAGNESIUM SERPL-MCNC: 2 MG/DL (ref 1.6–2.4)
MCH RBC QN AUTO: 31.2 PG (ref 26.6–33)
MCHC RBC AUTO-ENTMCNC: 32.5 G/DL (ref 31.5–35.7)
MCV RBC AUTO: 95.9 FL (ref 79–97)
MONOCYTES # BLD AUTO: 0.47 10*3/MM3 (ref 0.1–0.9)
MONOCYTES NFR BLD AUTO: 6.1 % (ref 5–12)
NEUTROPHILS # BLD AUTO: 4.94 10*3/MM3 (ref 1.7–7)
NEUTROPHILS NFR BLD AUTO: 64.4 % (ref 42.7–76)
NRBC BLD AUTO-RTO: 0 /100 WBC (ref 0–0.2)
PLATELET # BLD AUTO: 190 10*3/MM3 (ref 140–450)
PMV BLD AUTO: 10.3 FL (ref 6–12)
POTASSIUM BLD-SCNC: 4.3 MMOL/L (ref 3.5–5.2)
PROT SERPL-MCNC: 7.4 G/DL (ref 6–8.5)
RBC # BLD AUTO: 4.39 10*6/MM3 (ref 3.77–5.28)
SODIUM BLD-SCNC: 141 MMOL/L (ref 136–145)
WBC NRBC COR # BLD: 7.68 10*3/MM3 (ref 3.4–10.8)

## 2020-06-21 PROCEDURE — 83735 ASSAY OF MAGNESIUM: CPT | Performed by: PHYSICIAN ASSISTANT

## 2020-06-21 PROCEDURE — U0002 COVID-19 LAB TEST NON-CDC: HCPCS

## 2020-06-21 PROCEDURE — 80053 COMPREHEN METABOLIC PANEL: CPT | Performed by: PHYSICIAN ASSISTANT

## 2020-06-21 PROCEDURE — U0004 COV-19 TEST NON-CDC HGH THRU: HCPCS

## 2020-06-21 PROCEDURE — C9803 HOPD COVID-19 SPEC COLLECT: HCPCS

## 2020-06-21 PROCEDURE — 36415 COLL VENOUS BLD VENIPUNCTURE: CPT

## 2020-06-21 PROCEDURE — 85025 COMPLETE CBC W/AUTO DIFF WBC: CPT | Performed by: PHYSICIAN ASSISTANT

## 2020-06-21 NOTE — DISCHARGE INSTRUCTIONS

## 2020-06-21 NOTE — PAT
Patient instructed to bring CPAP mask and tubing to the hospital for overnight stay.  Explained that it is not necessary to bring their CPAP machine to the hospital instead a CPAP machine will be provided for use by the hospital. If patient knows their CPAP settings, those settings will be implemented.  If not, the CPAP machine will be utilized on the auto setting using their mask and tubing.    Patient verbalized understanding.    Last interrogation 05/13/2020.    No order in Epic for consent.  Note added to chart to have patient consent prior to surgery.

## 2020-06-22 LAB
REF LAB TEST METHOD: NORMAL
SARS-COV-2 RNA RESP QL NAA+PROBE: NOT DETECTED

## 2020-06-24 ENCOUNTER — HOSPITAL ENCOUNTER (OUTPATIENT)
Facility: HOSPITAL | Age: 80
Discharge: HOME | End: 2020-06-24
Attending: INTERNAL MEDICINE | Admitting: INTERNAL MEDICINE

## 2020-06-24 VITALS
RESPIRATION RATE: 16 BRPM | HEART RATE: 75 BPM | HEIGHT: 67 IN | WEIGHT: 205.47 LBS | OXYGEN SATURATION: 97 % | DIASTOLIC BLOOD PRESSURE: 73 MMHG | TEMPERATURE: 98.5 F | SYSTOLIC BLOOD PRESSURE: 137 MMHG | BODY MASS INDEX: 32.25 KG/M2

## 2020-06-24 DIAGNOSIS — I49.5 SSS (SICK SINUS SYNDROME) (HCC): ICD-10-CM

## 2020-06-24 PROCEDURE — 33228 REMV&REPLC PM GEN DUAL LEAD: CPT | Performed by: INTERNAL MEDICINE

## 2020-06-24 PROCEDURE — 99152 MOD SED SAME PHYS/QHP 5/>YRS: CPT | Performed by: INTERNAL MEDICINE

## 2020-06-24 PROCEDURE — S0260 H&P FOR SURGERY: HCPCS | Performed by: INTERNAL MEDICINE

## 2020-06-24 PROCEDURE — 25010000002 MIDAZOLAM PER 1 MG: Performed by: INTERNAL MEDICINE

## 2020-06-24 PROCEDURE — 25010000003 LIDOCAINE 1 % SOLUTION: Performed by: INTERNAL MEDICINE

## 2020-06-24 PROCEDURE — 25010000002 ONDANSETRON PER 1 MG: Performed by: INTERNAL MEDICINE

## 2020-06-24 PROCEDURE — 99153 MOD SED SAME PHYS/QHP EA: CPT | Performed by: INTERNAL MEDICINE

## 2020-06-24 PROCEDURE — 25010000002 FENTANYL CITRATE (PF) 100 MCG/2ML SOLUTION: Performed by: INTERNAL MEDICINE

## 2020-06-24 PROCEDURE — C1785 PMKR, DUAL, RATE-RESP: HCPCS | Performed by: INTERNAL MEDICINE

## 2020-06-24 DEVICE — GEN PM ASSURITY MRI DR RF PM2272: Type: IMPLANTABLE DEVICE | Status: FUNCTIONAL

## 2020-06-24 DEVICE — ENV PM AIGISRX ANTIBAC RESORB 2.5X2.7IN MD: Type: IMPLANTABLE DEVICE | Status: FUNCTIONAL

## 2020-06-24 RX ORDER — IBUPROFEN 600 MG/1
600 TABLET ORAL EVERY 6 HOURS SCHEDULED
Status: DISCONTINUED | OUTPATIENT
Start: 2020-06-24 | End: 2020-06-24 | Stop reason: HOSPADM

## 2020-06-24 RX ORDER — SODIUM CHLORIDE 0.9 % (FLUSH) 0.9 %
3 SYRINGE (ML) INJECTION EVERY 12 HOURS SCHEDULED
Status: DISCONTINUED | OUTPATIENT
Start: 2020-06-24 | End: 2020-06-24 | Stop reason: HOSPADM

## 2020-06-24 RX ORDER — CEFAZOLIN SODIUM 2 G/100ML
2 INJECTION, SOLUTION INTRAVENOUS ONCE
Status: DISCONTINUED | OUTPATIENT
Start: 2020-06-24 | End: 2020-06-24 | Stop reason: HOSPADM

## 2020-06-24 RX ORDER — ACETAMINOPHEN 325 MG/1
650 TABLET ORAL EVERY 6 HOURS
Status: DISCONTINUED | OUTPATIENT
Start: 2020-06-24 | End: 2020-06-24

## 2020-06-24 RX ORDER — ACETAMINOPHEN 325 MG/1
650 TABLET ORAL EVERY 6 HOURS SCHEDULED
Qty: 40 TABLET | Refills: 0 | Status: SHIPPED | OUTPATIENT
Start: 2020-06-24 | End: 2020-06-29

## 2020-06-24 RX ORDER — ACETAMINOPHEN 325 MG/1
650 TABLET ORAL EVERY 6 HOURS
Status: DISCONTINUED | OUTPATIENT
Start: 2020-06-24 | End: 2020-06-24 | Stop reason: HOSPADM

## 2020-06-24 RX ORDER — MIDAZOLAM HYDROCHLORIDE 1 MG/ML
INJECTION INTRAMUSCULAR; INTRAVENOUS AS NEEDED
Status: DISCONTINUED | OUTPATIENT
Start: 2020-06-24 | End: 2020-06-24 | Stop reason: HOSPADM

## 2020-06-24 RX ORDER — SODIUM CHLORIDE 0.9 % (FLUSH) 0.9 %
10 SYRINGE (ML) INJECTION AS NEEDED
Status: DISCONTINUED | OUTPATIENT
Start: 2020-06-24 | End: 2020-06-24 | Stop reason: HOSPADM

## 2020-06-24 RX ORDER — ONDANSETRON 2 MG/ML
INJECTION INTRAMUSCULAR; INTRAVENOUS AS NEEDED
Status: DISCONTINUED | OUTPATIENT
Start: 2020-06-24 | End: 2020-06-24 | Stop reason: HOSPADM

## 2020-06-24 RX ORDER — LIDOCAINE HYDROCHLORIDE 10 MG/ML
INJECTION, SOLUTION INFILTRATION; PERINEURAL AS NEEDED
Status: DISCONTINUED | OUTPATIENT
Start: 2020-06-24 | End: 2020-06-24 | Stop reason: HOSPADM

## 2020-06-24 RX ORDER — IBUPROFEN 600 MG/1
600 TABLET ORAL EVERY 6 HOURS SCHEDULED
Qty: 20 TABLET | Refills: 0 | Status: SHIPPED | OUTPATIENT
Start: 2020-06-24 | End: 2020-06-29

## 2020-06-24 RX ORDER — FENTANYL CITRATE 50 UG/ML
INJECTION, SOLUTION INTRAMUSCULAR; INTRAVENOUS AS NEEDED
Status: DISCONTINUED | OUTPATIENT
Start: 2020-06-24 | End: 2020-06-24 | Stop reason: HOSPADM

## 2020-06-24 RX ORDER — BUPIVACAINE HYDROCHLORIDE 5 MG/ML
INJECTION, SOLUTION EPIDURAL; INTRACAUDAL AS NEEDED
Status: DISCONTINUED | OUTPATIENT
Start: 2020-06-24 | End: 2020-06-24 | Stop reason: HOSPADM

## 2020-06-24 RX ADMIN — SODIUM CHLORIDE, PRESERVATIVE FREE 3 ML: 5 INJECTION INTRAVENOUS at 13:27

## 2020-06-24 NOTE — H&P
Cardiac Electrophysiology Procedure Note      [facility]     PROCEDURE: PACEMAKER GENERATOR REPLACEMENT //     OPERATION PERFORMED:     1. Explantation of SJM pulse generator with serial number [id number], implanted 6.30.2010.  2. Testing of retained pacing leads:        A. Atrial lead 2088TC, 46 cm, [id number].        B. Right ventricular lead 2088TC, 52 cm, [id number].    3. Implantation of SJM pulse generator with serial number [id number].    ATTENDING SURGEON: Deniz Miles,     MODERATE SEDATION FOR PROCEDURE:    Moderate sedation was given during this procedure.    I supervised and directed RN to administer this sedation.  This staff member also monitored the patient's hemodynamic and respiratory status and response to these medications.  Please see the full detailed procedure report generated by the electrophysiology laboratory staff.  The patient tolerated moderate sedation well.  There were no complications regarding sedation.  The total dose of fentanyl was 75 mcg and the total dose of midazolam was 3.5 mg.  The total dose of Brevital was 50 mg.  First sedation was administered at 1528 and continued through 1551.    ESTIMATED BLOOD LOSS: less than 20cc    COMPLICATIONS: None    TIME OUT: Time out was completed with verification of the correct patient identity, procedure to be performed, procedure site and implanted equipment.    INDICATION FOR PROCEDURE:  Briefly,Lashawn Martino is a 80 y.o. female with a history of sinus node dysfunction who was initially implanted with a dual chamber pacemaker on June 30 of 2010.  The patient was recently seen in our device clinic at which time the patient's device was discovered to be at the elective replacement interval.  The patient was deemed appropriate for a generator replacement.    The patient was able to give written informed consent after revisiting the key portions of the risk versus benefit profile of the procedure.  This  discussion was framed by our lengthy conversations  (please see our detailed notes).  Patient verbalized strong understanding of this discussion and a strong desire to proceed with the procedure.  Please note that this detailed informed consent process utilized mutual and shared decision making process between all parties involved, principally the physician and patient, but also potentially with input from the patient's selected family and friends.    PROCEDURE AND FINDINGS:  The patient was brought to the electrophysiology suite in a post absorptive state.  Informed consent was obtained prior to the procedure and confirmed.  Intravenous prophylactic antibiotics were administered and confirmed to be completely infused prior to the start of the procedure.  After the site of implantation was prepped and draped in the usual sterile fashion and after adequate anesthesia was given, the skin was infiltrated with 1% lidocaine and 0.5% bupivicaine 50/50 mixture.  The skin was incised with a #10 scalpel.  Blunt and electrosurgical dissection was carried out to the pulse generator pocket.  The leads were carefully isolated with blunt and electrosurgical dissection.  Careful attention was paid not to damage the leads.  The pulse generator was explanted and the pocket was copiously irrigated with antibiotic containing normal saline and subsequently observed.  Once adequate hemostasis was confirmed within the pocket, the leads were detached from the pulse generator.  The leads were tested for adequate sensing, impedances and pacing thresholds.  The lead tips for all leads were cleaned and dried thoroughly.  The leads were attached to the appropriate ports on the new pulse generator.  Tug testing was performed on all connections.  The device and leads were placed within the pocket such that the coiled redundant leads were posterior to the pulse generator.  Once again, the device was tested for adequate sensing, impedances and  pacing thresholds.  The pulse generator was then sutured to the fascia in a medial location within the pocket using non absorbable suture.  A [employer]tronic Tyrex antibiotic impregnated bio-absorbable pouch was placed within the pocket that including all of the redundant leads and pulse generator for the prophylaxis of surgical device infection.  The pocket was closed with two layers of suture using 2-0 then 3-0 Vicryl, followed by a layer of surgical adhesive and finally a sterile occlusive dressing.                     MEASURED DEVICE DATA:    Atrial lead                   sensing:                 3.5 mV                   impedance:           450 Ohms                   Threshold:            0.75 Volts at 0.5 ms    RV lead                   sensin.2 mV                   pacing impedance:    510 Ohms                   Threshold:                  1 Volts at 0.5 ms                                                        PROGRAMMED PARAMETERS:    Mode:                                 DDDR  Lower Rate:                       75 pulses per minute  Upper Sensor Rate:          130 pulses per minute  Upper Tracking Rate:      130 pulses per minute  Mode Switch Rate:           170 bpm      CONCLUSION:  Successful pacemaker generator change.      Patient is to follow up with our clinic in approximately one week and then myself in 3 months.    No lovenox or heparin at any dose is to be given.    FOR THE PATIENT    Please do not lift more than 10 pounds or abduct the shoulder joint / or raise the arm above the shoulder for 6 weeks after device was implanted (this does not apply to subcutaneous ICDs).    Avoid activities that involve heavy lifting or rough contact that could result in blows to your implant site and to allow your incision time to heal.    No shower or getting device incision wet for 2 days post-operatively.    Keep wound exposed to air unless otherwise instructed, the surgical glue is the  bandage.    No creams, lotions, or powders to incision.    Please avoid allowing bra strap or suspenders to lay over incision until completely healed.    Avoid hot tubs or pools until incision completely healed.    No driving for 24 hours post-operatively after device implant.    Call your doctor if you have any swelling, redness or discharge around your incision, notice anything unusual or unexpected, or you develop a fever that does not go away in two or three days.    Call your doctor if you hear any beeping sounds / vibratory alerts from your device as this indicates your device needs to be checked immediately.    Carry your Medical Device ID Card with you at all times.  Please call our office () with any questions about the device or the wounds.            Deniz Miles DO, FACC, RS  Cardiac Electrophysiologist  [facility] / [facility]

## 2020-06-24 NOTE — DISCHARGE INSTRUCTIONS
FOR THE PATIENT     Please do not lift more than 10 pounds or abduct the shoulder joint / or raise the arm above the shoulder for 6 weeks after device was implanted (this does not apply to subcutaneous ICDs).     Avoid activities that involve heavy lifting or rough contact that could result in blows to your implant site and to allow your incision time to heal.     No shower or getting device incision wet for 2 days post-operatively.     Keep wound exposed to air unless otherwise instructed, the surgical glue is the bandage.     No creams, lotions, or powders to incision.     Please avoid allowing bra strap or suspenders to lay over incision until completely healed.     Avoid hot tubs or pools until incision completely healed.     No driving for 24 hours post-operatively after device implant.     Call your doctor if you have any swelling, redness or discharge around your incision, notice anything unusual or unexpected, or you develop a fever that does not go away in two or three days.     Call your doctor if you hear any beeping sounds / vibratory alerts from your device as this indicates your device needs to be checked immediately.     Carry your Medical Device ID Card with you at all times.  Please call our office () with any questions about the device or the wounds.

## 2020-06-24 NOTE — H&P
Primary Cardiologist: Dr. Zavala     Chief Complaint: PPM at [facility]       Subjective:     Patient is a 80 y.o. female who presents with a history of SSS s/p DDD PPM that has reached [facility] for generator change out. She was noted to have hit [facility] via remote monitoring 5/2020. She has not had any recent illness, fever, cough. She denies any chest pain, sob, edema, palps.     Previous cardiac studies and procedures:  October 2014 [place] scan myocardial perfusion imaging   1.  Adequate stress test.    2.  No evidence for inducible ischemia on perfusion images during Lexiscan    stress.    3.  Normal LV systolic function (EF 66%) with normal left ventricular    end-diastolic volume.     Past Medical History:   Diagnosis Date   • Anxiety and depression    • Arthritis    • Fibromyalgia    • Glaucoma    • Hypertension    • Osteoporosis    • Psoriasis    • Wears glasses       Past Surgical History:   Procedure Laterality Date   • BACK SURGERY     • BREAST SURGERY      Cyst removal   • COLONOSCOPY  2019   • EYE SURGERY Bilateral    • HYSTERECTOMY     • JOINT REPLACEMENT Left     knee   • PACEMAKER IMPLANTATION     • SHOULDER SURGERY Right       Allergies   Allergen Reactions   • Penicillins Other (See Comments)     Pt not aware of the reactions, hasn't taken it for years   • Tetanus Toxoids Palpitations     Social History     Tobacco Use   • Smoking status: Never Smoker   • Smokeless tobacco: Never Used   Substance Use Topics   • Alcohol use: No     Frequency: Never      FH: No family history on file.     No current facility-administered medications for this encounter.     Review of Systems  Review of Systems:  General: no recent weight loss/gain, weakness or fatigue  Skin: no rashes, lumps, or other skin changes  HEENT: no dizziness, lightheadedness, or vision changes  Respiratory: no cough or hemoptysis  Cardiovascular: no palpitations, and tachycardia  Gastrointestinal: no black/tarry stools or diarrhea  Urinary: no change in  frequency or urgency  Peripheral Vascular: no claudication or leg cramps  Musculoskeletal: no muscle or joint pain/stiffness  Psychiatric: no depression or excessive stress  Neurological: no sensory or motor loss, no syncope  Hematologic: no anemia, easy bruising or bleeding  Endocrine: no thyroid problems, nor heat or cold intolerance        Objective:       There were no vitals taken for this visit.    No intake/output data recorded.  No intake/output data recorded.    Physical Exam:  General-Well Nourished, Well developed  Eyes - PERRLA  Neck- supple, No mass  CV- regular rate and rhythm, no MRG  Lung- clear bilaterally  Abd- soft, +BS  Musc/skel - Norm strength and range of motion  Skin- warm and dry  Neuro - Alert & Oriented x 3, appropriate mood.      Data Review:     No results found for this or any previous visit (from the past 24 hour(s)).        Assessment:     SSS (sick sinus syndrome) (CMS/[facility])         Plan:     1. SSS s/p SJM Dual Chamber Pacemaker: device reached [facility] 5/2020. Patient will undergo generator change today. Wound check in 12-14 days.     Electronically signed by RIAN Brito, 06/24/20, 12:59 PM.            Cardiac Electrophysiology Procedure Note      [facility]           CARDIAC ELECTROPHYSIOLOGIST ADDENDUM    I have personally performed a face to face diagnostic evaluation on this patient.  I have reviewed the note authored by the advanced practice provider and agree with the history of present illness, past medical history, surgical history, social history, family history and review of systems.. I have reviewed current medications, and lab values.  My findings are below:  .    History of Present Illness:  80 y.o. female presents for elective pulse generator replacement today.  This the first time of had a chance to meet her.  She has no complaints.  She is doing well.  No chest pain nausea vomiting fevers or chills.  The device was noted to be elective  "replacement indicator by remote device check on May of this year.    He denies nausea vomiting fevers or chills chest pain syncope presyncope COVID-19 exposure.    Review of Systems:   · Constitutional: No Fevers/Chills, No recent weight gain weight loss, No fatigue.  · Cardiovascular: Per HPI  · Respiratory: No cough or wheezing, no sputum production. No hematemesis.    · Gastrointestinal: No Nausea, Vomiting, Diarrhea, or Constipation. No bloody or dark stools.  · All others negative except what is noted above and in my OK's note.     Physical Exam   Vital Signs: /84 (BP Location: Left arm, Patient Position: Lying)   Pulse 62   Temp 98.5 °F (36.9 °C) (Temporal)   Resp 16   Ht 170.2 cm (67\")   Wt 93.2 kg (205 lb 7.5 oz)   SpO2 97%   BMI 32.18 kg/m²        Admission Weight: 93.2 kg (205 lb 7.5 oz)     General appearance: Alert oriented and cooperative.  In no acute distress  Skin:  Warm and Dry to touch  Head: Normocephalic, without obvious abnormality, atraumatic.   Eyes: Conjunctivae unremarkable, EOM's intact.Sclera non icteric.  Neck: No JVD, No carotid bruit. Neck supple, trachea midline  Lungs: Clear to auscultation bilaterally, no use of accessory muscles.    Heart:: RRR with Normal S1 and S2 . No murmurs and no gallops.  Abdomen: Soft, non-tender. Bowel sounds normal.  Extremities: No edema.  Neurologic: Oriented to time, person and place, affect appropriate.  No focal/major motor or sensory defects noted.    Psychiatric:  Appropriate mood, memory and judgment.  Good use of semantics and logic.    PLAN:    Very pleasant 80-year-old female patient with history of sinus node function whose device has reached elective placement indicator due to normal battery depletion.  It is a [employer] device.  She has no questions.  We reviewed the risk-benefit profile extensively she wished to proceed.           Deniz Miles, DO     "

## 2020-06-30 ENCOUNTER — APPOINTMENT (OUTPATIENT)
Dept: CT IMAGING | Facility: HOSPITAL | Age: 80
DRG: 948 | End: 2020-06-30
Attending: INTERNAL MEDICINE
Payer: MEDICARE

## 2020-06-30 ENCOUNTER — HOSPITAL ENCOUNTER (INPATIENT)
Facility: HOSPITAL | Age: 80
LOS: 1 days | Discharge: HOME HEALTH | DRG: 948 | End: 2020-07-01
Attending: INTERNAL MEDICINE | Admitting: INTERNAL MEDICINE
Payer: MEDICARE

## 2020-06-30 PROBLEM — R41.82 ALTERED MENTAL STATUS: Status: ACTIVE | Noted: 2020-06-30

## 2020-06-30 LAB
A/G RATIO: 1.5 (ref 0.8–2)
ALBUMIN SERPL-MCNC: 4 G/DL (ref 3.4–4.8)
ALP BLD-CCNC: 116 U/L (ref 25–100)
ALT SERPL-CCNC: 9 U/L (ref 4–36)
AMPHETAMINE SCREEN, URINE: ABNORMAL
ANION GAP SERPL CALCULATED.3IONS-SCNC: 9 MMOL/L (ref 3–16)
AST SERPL-CCNC: 13 U/L (ref 8–33)
BARBITURATE SCREEN URINE: ABNORMAL
BASOPHILS ABSOLUTE: 0 K/UL (ref 0–0.1)
BASOPHILS RELATIVE PERCENT: 0.3 %
BENZODIAZEPINE SCREEN, URINE: ABNORMAL
BILIRUB SERPL-MCNC: <0.2 MG/DL (ref 0.3–1.2)
BILIRUBIN URINE: NEGATIVE
BLOOD, URINE: NEGATIVE
BUN BLDV-MCNC: 24 MG/DL (ref 6–20)
CALCIUM SERPL-MCNC: 9.4 MG/DL (ref 8.5–10.5)
CANNABINOID SCREEN URINE: ABNORMAL
CHLORIDE BLD-SCNC: 102 MMOL/L (ref 98–107)
CLARITY: CLEAR
CO2: 30 MMOL/L (ref 20–30)
COCAINE METABOLITE SCREEN URINE: ABNORMAL
COLOR: YELLOW
CREAT SERPL-MCNC: 0.9 MG/DL (ref 0.4–1.2)
EOSINOPHILS ABSOLUTE: 0.1 K/UL (ref 0–0.4)
EOSINOPHILS RELATIVE PERCENT: 2.2 %
FOLATE: >20 NG/ML
GFR AFRICAN AMERICAN: >59
GFR NON-AFRICAN AMERICAN: 60
GLOBULIN: 2.6 G/DL
GLUCOSE BLD-MCNC: 119 MG/DL (ref 74–106)
GLUCOSE URINE: NEGATIVE MG/DL
HCT VFR BLD CALC: 36.9 % (ref 37–47)
HEMOGLOBIN: 12.1 G/DL (ref 11.5–16.5)
IMMATURE GRANULOCYTES #: 0 K/UL
IMMATURE GRANULOCYTES %: 0.3 % (ref 0–5)
KETONES, URINE: NEGATIVE MG/DL
LEUKOCYTE ESTERASE, URINE: NEGATIVE
LYMPHOCYTES ABSOLUTE: 1.6 K/UL (ref 1.5–4)
LYMPHOCYTES RELATIVE PERCENT: 26.1 %
Lab: ABNORMAL
MAGNESIUM: 2 MG/DL (ref 1.7–2.4)
MCH RBC QN AUTO: 31.3 PG (ref 27–32)
MCHC RBC AUTO-ENTMCNC: 32.8 G/DL (ref 31–35)
MCV RBC AUTO: 95.6 FL (ref 80–100)
METHADONE SCREEN, URINE: ABNORMAL
METHAMPHETAMINE, URINE: ABNORMAL
MICROSCOPIC EXAMINATION: NORMAL
MONOCYTES ABSOLUTE: 0.4 K/UL (ref 0.2–0.8)
MONOCYTES RELATIVE PERCENT: 6.3 %
NEUTROPHILS ABSOLUTE: 3.9 K/UL (ref 2–7.5)
NEUTROPHILS RELATIVE PERCENT: 64.8 %
NITRITE, URINE: NEGATIVE
OPIATE SCREEN URINE: ABNORMAL
PDW BLD-RTO: 12.8 % (ref 11–16)
PH UA: 6 (ref 5–8)
PHENCYCLIDINE SCREEN URINE: ABNORMAL
PLATELET # BLD: 134 K/UL (ref 150–400)
PMV BLD AUTO: 10 FL (ref 6–10)
POTASSIUM REFLEX MAGNESIUM: 4 MMOL/L (ref 3.4–5.1)
PROPOXYPHENE SCREEN, URINE: ABNORMAL
PROTEIN UA: NEGATIVE MG/DL
RBC # BLD: 3.86 M/UL (ref 3.8–5.8)
SODIUM BLD-SCNC: 141 MMOL/L (ref 136–145)
SPECIFIC GRAVITY UA: 1.02 (ref 1–1.03)
TOTAL PROTEIN: 6.6 G/DL (ref 6.4–8.3)
TRICYCLIC, URINE: POSITIVE
TSH SERPL DL<=0.05 MIU/L-ACNC: 2.24 UIU/ML (ref 0.35–5.5)
UR OXYCODONE RAPID SCREEN: ABNORMAL
URINE REFLEX TO CULTURE: NORMAL
URINE TYPE: NORMAL
UROBILINOGEN, URINE: 0.2 E.U./DL
VITAMIN B-12: 765 PG/ML (ref 211–911)
VITAMIN D 25-HYDROXY: 49.8 (ref 32–100)
WBC # BLD: 6 K/UL (ref 4–11)

## 2020-06-30 PROCEDURE — 1200000000 HC SEMI PRIVATE

## 2020-06-30 PROCEDURE — 83735 ASSAY OF MAGNESIUM: CPT

## 2020-06-30 PROCEDURE — 81003 URINALYSIS AUTO W/O SCOPE: CPT

## 2020-06-30 PROCEDURE — 70450 CT HEAD/BRAIN W/O DYE: CPT

## 2020-06-30 PROCEDURE — 80053 COMPREHEN METABOLIC PANEL: CPT

## 2020-06-30 PROCEDURE — 6360000002 HC RX W HCPCS: Performed by: PHYSICIAN ASSISTANT

## 2020-06-30 PROCEDURE — 97161 PT EVAL LOW COMPLEX 20 MIN: CPT

## 2020-06-30 PROCEDURE — 97165 OT EVAL LOW COMPLEX 30 MIN: CPT

## 2020-06-30 PROCEDURE — 82306 VITAMIN D 25 HYDROXY: CPT

## 2020-06-30 PROCEDURE — 82746 ASSAY OF FOLIC ACID SERUM: CPT

## 2020-06-30 PROCEDURE — 85025 COMPLETE CBC W/AUTO DIFF WBC: CPT

## 2020-06-30 PROCEDURE — 97530 THERAPEUTIC ACTIVITIES: CPT

## 2020-06-30 PROCEDURE — 82607 VITAMIN B-12: CPT

## 2020-06-30 PROCEDURE — 80307 DRUG TEST PRSMV CHEM ANLYZR: CPT

## 2020-06-30 PROCEDURE — 6370000000 HC RX 637 (ALT 250 FOR IP): Performed by: PHYSICIAN ASSISTANT

## 2020-06-30 PROCEDURE — 36415 COLL VENOUS BLD VENIPUNCTURE: CPT

## 2020-06-30 PROCEDURE — 84443 ASSAY THYROID STIM HORMONE: CPT

## 2020-06-30 RX ORDER — ACETAMINOPHEN 325 MG/1
650 TABLET ORAL EVERY 6 HOURS PRN
Status: DISCONTINUED | OUTPATIENT
Start: 2020-06-30 | End: 2020-07-01 | Stop reason: HOSPADM

## 2020-06-30 RX ORDER — POLYETHYLENE GLYCOL 3350 17 G/17G
17 POWDER, FOR SOLUTION ORAL DAILY PRN
Status: DISCONTINUED | OUTPATIENT
Start: 2020-06-30 | End: 2020-07-01 | Stop reason: HOSPADM

## 2020-06-30 RX ORDER — PROPRANOLOL HCL 60 MG
60 CAPSULE, EXTENDED RELEASE 24HR ORAL DAILY
COMMUNITY
End: 2020-10-22 | Stop reason: SDUPTHER

## 2020-06-30 RX ORDER — DOCUSATE SODIUM 100 MG/1
100 CAPSULE, LIQUID FILLED ORAL 2 TIMES DAILY PRN
Status: DISCONTINUED | OUTPATIENT
Start: 2020-06-30 | End: 2020-07-01 | Stop reason: HOSPADM

## 2020-06-30 RX ORDER — QUETIAPINE FUMARATE 25 MG/1
25 TABLET, FILM COATED ORAL 2 TIMES DAILY
Status: ON HOLD | COMMUNITY
End: 2020-07-01 | Stop reason: HOSPADM

## 2020-06-30 RX ORDER — CITALOPRAM 20 MG/1
20 TABLET ORAL DAILY
Status: DISCONTINUED | OUTPATIENT
Start: 2020-07-01 | End: 2020-06-30

## 2020-06-30 RX ORDER — CITALOPRAM 20 MG/1
40 TABLET ORAL DAILY
Status: DISCONTINUED | OUTPATIENT
Start: 2020-07-01 | End: 2020-07-01 | Stop reason: HOSPADM

## 2020-06-30 RX ORDER — PROPRANOLOL HYDROCHLORIDE 20 MG/1
20 TABLET ORAL 3 TIMES DAILY
Status: DISCONTINUED | OUTPATIENT
Start: 2020-07-01 | End: 2020-07-01 | Stop reason: HOSPADM

## 2020-06-30 RX ORDER — ALPRAZOLAM 0.5 MG/1
0.5 TABLET ORAL NIGHTLY PRN
Status: DISCONTINUED | OUTPATIENT
Start: 2020-06-30 | End: 2020-07-01 | Stop reason: HOSPADM

## 2020-06-30 RX ORDER — ASPIRIN 81 MG/1
81 TABLET, CHEWABLE ORAL DAILY
COMMUNITY
End: 2021-03-08

## 2020-06-30 RX ORDER — M-VIT,TX,IRON,MINS/CALC/FOLIC 27MG-0.4MG
1 TABLET ORAL NIGHTLY
Status: DISCONTINUED | OUTPATIENT
Start: 2020-06-30 | End: 2020-07-01 | Stop reason: HOSPADM

## 2020-06-30 RX ORDER — M-VIT,TX,IRON,MINS/CALC/FOLIC 27MG-0.4MG
2 TABLET ORAL DAILY
COMMUNITY
End: 2021-03-08

## 2020-06-30 RX ORDER — POTASSIUM CHLORIDE 1500 MG/1
20 TABLET, FILM COATED, EXTENDED RELEASE ORAL DAILY
Status: ON HOLD | COMMUNITY
End: 2020-07-01 | Stop reason: HOSPADM

## 2020-06-30 RX ORDER — DULOXETIN HYDROCHLORIDE 30 MG/1
60 CAPSULE, DELAYED RELEASE ORAL DAILY
Status: DISCONTINUED | OUTPATIENT
Start: 2020-07-01 | End: 2020-07-01 | Stop reason: HOSPADM

## 2020-06-30 RX ORDER — PROPRANOLOL HYDROCHLORIDE 60 MG/1
60 TABLET ORAL DAILY
Status: ON HOLD | COMMUNITY
End: 2020-06-30

## 2020-06-30 RX ORDER — PROPRANOLOL HYDROCHLORIDE 20 MG/1
60 TABLET ORAL DAILY
Status: DISCONTINUED | OUTPATIENT
Start: 2020-07-01 | End: 2020-06-30 | Stop reason: DRUGHIGH

## 2020-06-30 RX ORDER — OMEPRAZOLE 20 MG/1
20 CAPSULE, DELAYED RELEASE ORAL DAILY
COMMUNITY
End: 2020-10-22 | Stop reason: SDUPTHER

## 2020-06-30 RX ORDER — ACETAMINOPHEN 650 MG/1
650 SUPPOSITORY RECTAL EVERY 6 HOURS PRN
Status: DISCONTINUED | OUTPATIENT
Start: 2020-06-30 | End: 2020-07-01 | Stop reason: HOSPADM

## 2020-06-30 RX ORDER — DONEPEZIL HYDROCHLORIDE 5 MG/1
10 TABLET, FILM COATED ORAL NIGHTLY
Status: DISCONTINUED | OUTPATIENT
Start: 2020-06-30 | End: 2020-07-01 | Stop reason: HOSPADM

## 2020-06-30 RX ORDER — VITAMIN B COMPLEX
2 TABLET ORAL DAILY
Status: DISCONTINUED | OUTPATIENT
Start: 2020-07-01 | End: 2020-07-01 | Stop reason: HOSPADM

## 2020-06-30 RX ORDER — M-VIT,TX,IRON,MINS/CALC/FOLIC 27MG-0.4MG
1 TABLET ORAL DAILY
Status: DISCONTINUED | OUTPATIENT
Start: 2020-06-30 | End: 2020-06-30

## 2020-06-30 RX ORDER — FUROSEMIDE 40 MG/1
40 TABLET ORAL DAILY
Status: DISCONTINUED | OUTPATIENT
Start: 2020-07-01 | End: 2020-07-01 | Stop reason: HOSPADM

## 2020-06-30 RX ORDER — ONDANSETRON 2 MG/ML
4 INJECTION INTRAMUSCULAR; INTRAVENOUS EVERY 6 HOURS PRN
Status: DISCONTINUED | OUTPATIENT
Start: 2020-06-30 | End: 2020-07-01 | Stop reason: HOSPADM

## 2020-06-30 RX ORDER — FUROSEMIDE 40 MG/1
40 TABLET ORAL DAILY
COMMUNITY
End: 2020-10-22 | Stop reason: SDUPTHER

## 2020-06-30 RX ORDER — QUETIAPINE FUMARATE 25 MG/1
25 TABLET, FILM COATED ORAL 2 TIMES DAILY
Status: DISCONTINUED | OUTPATIENT
Start: 2020-06-30 | End: 2020-07-01 | Stop reason: HOSPADM

## 2020-06-30 RX ORDER — PANTOPRAZOLE SODIUM 40 MG/1
40 TABLET, DELAYED RELEASE ORAL
Status: DISCONTINUED | OUTPATIENT
Start: 2020-07-01 | End: 2020-07-01 | Stop reason: HOSPADM

## 2020-06-30 RX ORDER — ACETAMINOPHEN 160 MG
1 TABLET,DISINTEGRATING ORAL DAILY
COMMUNITY
End: 2020-10-22 | Stop reason: SDUPTHER

## 2020-06-30 RX ORDER — CITALOPRAM 20 MG/1
20 TABLET ORAL DAILY
COMMUNITY
End: 2020-10-22 | Stop reason: SDUPTHER

## 2020-06-30 RX ORDER — ASPIRIN 81 MG/1
81 TABLET, CHEWABLE ORAL DAILY
Status: DISCONTINUED | OUTPATIENT
Start: 2020-07-01 | End: 2020-07-01 | Stop reason: HOSPADM

## 2020-06-30 RX ORDER — ALPRAZOLAM 0.5 MG/1
0.5 TABLET ORAL NIGHTLY
COMMUNITY
End: 2020-10-13 | Stop reason: SDUPTHER

## 2020-06-30 RX ORDER — DULOXETIN HYDROCHLORIDE 60 MG/1
60 CAPSULE, DELAYED RELEASE ORAL DAILY
COMMUNITY
End: 2020-10-22 | Stop reason: SDUPTHER

## 2020-06-30 RX ORDER — PROMETHAZINE HYDROCHLORIDE 25 MG/1
12.5 TABLET ORAL EVERY 6 HOURS PRN
Status: DISCONTINUED | OUTPATIENT
Start: 2020-06-30 | End: 2020-07-01 | Stop reason: HOSPADM

## 2020-06-30 RX ORDER — GABAPENTIN 300 MG/1
300 CAPSULE ORAL NIGHTLY
Status: DISCONTINUED | OUTPATIENT
Start: 2020-06-30 | End: 2020-07-01 | Stop reason: HOSPADM

## 2020-06-30 RX ORDER — DONEPEZIL HYDROCHLORIDE 10 MG/1
10 TABLET, FILM COATED ORAL NIGHTLY
Status: ON HOLD | COMMUNITY
End: 2020-07-01 | Stop reason: HOSPADM

## 2020-06-30 RX ADMIN — GABAPENTIN 300 MG: 300 CAPSULE ORAL at 21:10

## 2020-06-30 RX ADMIN — MULTIPLE VITAMINS W/ MINERALS TAB 1 TABLET: TAB at 21:10

## 2020-06-30 RX ADMIN — ENOXAPARIN SODIUM 40 MG: 40 INJECTION SUBCUTANEOUS at 21:10

## 2020-06-30 RX ADMIN — ALPRAZOLAM 0.5 MG: 0.5 TABLET ORAL at 21:20

## 2020-06-30 NOTE — CARE COORDINATION
06/30/20 0935   Discharge [facility]   Current Services Prior To Admission C-pap;Durable Medical Equipment   DME Walker;Cpap;Cane  (raised toilet seat, 4 wheeled walker)   Potential Assistance Needed N/A   Potential Assistance Purchasing Medications No   Type of Home Care Services None   Patient expects to be discharged to: Home   Expected Discharge Date 07/03/20   Follow Up Appointment: Best Day/Time    (any)     Spoke with pt at [facility]. Pt is A&O. Answered all questions appropriately. States she lives with daughter and son-in-law. Spoke with son-in-law in [place] - he states pt is combative, confused, and has a history of bipolar disorder. Spoke with son-in-law with [facility] and notified him that pt may not meet inpatient criteria. He verbalized understanding, but states that returning home may not be an option due to relationship with daughter. Son-in-law, ISMA, states that we may have to call in  and let them handle her. I spoke with Sharita Jenkins from [facility] and he advised that if patient is cleared to DC home and family refuses to get her, then we need to call the police and file charges of abandonment. Pt states she doesn't want to go to the NH and assisted living isn't an option due to finances. .  States that she signed over the house to her daughter a few years ago and now she lives there with them.

## 2020-06-30 NOTE — PROGRESS NOTES
Occupational Therapy   Occupational Therapy Initial Assessment  Date: 2020   Patient Name: Robert Lim  MRN: [id number]     : 1940    Date of Service: 2020    Discharge Recommendations:  Continue to assess pending progress  OT Equipment Recommendations  Equipment Needed: No    Assessment   Assessment: Pt agreeable to skilled OT evaluation & interventions; no pain reported. Pt reports prior I with ADLs but has a history of falls & is unable to ID pacemaker precautions. Pt has orthostatic hypotension upon standing- OT provided education about prevention to avoid falls & dizziness. Pt reports standing in shower at home & holding onto rails for support which could be unsafe with diziness. ACL indicates level of 4.8 meaning pt is physically I with daily care but requires assist to initiate or monitor quality of care. Pt may need assist with simple food preparation & can follow 1 step at a time directions with consistency problems with STM deficits. According to assessment score of 4.8, pt may live alone with daily assistance to monitor safety & check problem-solving methods. Pt reports she lives with family who is not frequently present. Pt is a good candidate to benefit from skilled OT services while IP. Pt supine in bed with call bell in reach. Prognosis: Good  Decision Making: Low Complexity  OT Education: OT Role;Plan of Care;Precautions;Transfer Training  REQUIRES OT FOLLOW UP: Yes  Activity Tolerance  Activity Tolerance: Patient Tolerated treatment well         Patient Diagnosis(es): There were no encounter diagnoses. has a past medical history of Arthritis, Fibromyalgia, Hypertension, and Osteoporosis. has a past surgical history that includes pacemaker placement; Hysterectomy; back surgery; joint replacement (Left); shoulder surgery (Right); and Colonoscopy (N/A, 10/3/2019).     Restrictions  Restrictions/Precautions  Restrictions/Precautions: Cardiac, Fall Risk, General Precautions  Required Braces or Orthoses?: Yes(Has knee brace; noncompliant with use)    Subjective   General  Chart Reviewed: Yes  Patient assessed for rehabilitation services?: Yes  Family / Caregiver Present: No  Referring Practitioner: NANDO Mccray  Patient Currently in Pain: Denies  Vital Signs  Temp: 97.6 °F (36.4 °C)  Temp Source: Temporal  Pulse: 75  Heart Rate Source: Monitor  BP: (!) 157/99  BP Location: Left Arm  BP Upper/Lower: Upper  MAP (mmHg): 118  Patient Currently in Pain: Denies  Oxygen Therapy  SpO2: 96 %  O2 Device: None (Room air)  Social/Functional History  Social/Functional History  Lives With: Daughter  Type of Home: House  Home Layout: Two level  Home Access: Level entry  Bathroom Shower/Tub: Walk-in shower  Bathroom Toilet: Handicap height  Bathroom Equipment: Grab bars in shower  Bathroom Accessibility: Accessible  Home Equipment: Roll About, Quad cane  Receives Help From: Family  ADL Assistance: (Reports I )  Homemaking Assistance: (Reports I)  Homemaking Responsibilities: Yes  Transfer Assistance: (SUP)  Active : No  Occupation: Retired  Type of occupation:       Objective   Vision: Impaired  Vision Exceptions: Wears glasses for reading  Hearing: Within functional limits    Orientation  Overall Orientation Status: Within Functional Limits  Observation/Palpation  Observation: Pt supine in bed; RA; NAD; Pleasant & cooperative  Balance  Standing Balance: Stand by assistance  Functional Mobility  Functional - Mobility Device: Other(Quad Cane)  Assist Level: Stand by assistance  Functional Mobility Comments: History of falls; ambulated ~100ft        Bed mobility  Rolling to Left: Modified independent  Rolling to Right: Modified independent  Supine to Sit: Modified independent  Sit to Supine: Modified independent  Scooting: Modified independent  Transfers  Stand Step Transfers: Stand by assistance     Cognition  Overall Cognitive Status: Exceptions  Cognition Comment: Guille Cog Assessment Level 5. LUE AROM (degrees)  LUE AROM : WFL  RUE AROM (degrees)  RUE AROM : WFL  LUE Strength  Gross LUE Strength: WFL  L Hand General: 4-/5  RUE Strength  Gross RUE Strength: WFL  R Hand General: 4-/5      Plan     3-5x/week  1 week    Goals  Short term goals  Time Frame for Short term goals: 1 week  Short term goal 1: Pt will perform HEP with handout with Mod I  Short term goal 2: Pt will dress UB while adhering to pacemaker precautions  Short term goal 3: Pt will perform showering tasks while adhering to pacemaker precautions with Mod I   Short term goal 4: Pt will perform BADLs standing at sink without LOB   Short term goal 5: Pt will perform toilet transfer with Mod I        Therapy Time   Individual Concurrent Group Co-treatment   Time In 0141         Time Out 0310         Minutes 80           Shania Quintana, OTR/L  This note serves as D/C summary if patient is discharged prior to next visit.

## 2020-06-30 NOTE — PROGRESS NOTES
Physical Therapy    Facility/Department: [facility] MED SURG  Initial Assessment    NAME: Shashank Alvarado  : 1940  MRN: [id number]    Date of Service: 2020    Discharge Recommendations:  Continue to assess pending progress, Home with assist PRN        Assessment   Assessment: Pt demonstrated fair + to good - balance during functional mobility. Due to living with minimal assistance, she would benefit from skilled PT while in the acute setting to further address endurance and safety with functional mobility. PT adjusted pt's cane to proper height during evaluation. Treatment Diagnosis: functional decline  Prognosis: Good  Decision Making: Low Complexity  REQUIRES PT FOLLOW UP: Yes  Activity Tolerance  Activity Tolerance: Patient Tolerated treatment well       Patient Diagnosis(es): There were no encounter diagnoses. has a past medical history of Arthritis, Fibromyalgia, Hypertension, and Osteoporosis. has a past surgical history that includes pacemaker placement; Hysterectomy; back surgery; joint replacement (Left); shoulder surgery (Right); and Colonoscopy (N/A, 10/3/2019). Restrictions  Restrictions/Precautions  Restrictions/Precautions: Cardiac, Fall Risk, General Precautions(recent pacemaker placement)  Required Braces or Orthoses?: Yes(Has knee brace; noncompliant with use)     Vision/Hearing  Vision: Impaired  Vision Exceptions: Wears glasses for reading  Hearing: Within functional limits       Subjective  General  Chart Reviewed: Yes  Patient assessed for rehabilitation services?: Yes  Family / Caregiver Present: No  Referring Practitioner: Tomer Raman PA-C  Referral Date : 20  Diagnosis: Altered Mental Status  Subjective  Subjective: Pt presents supine in bed agreeable to PT evaluation.   Pt states she recently had a pacemaker placement in the past few days; she shows PT surgical incision at L upper chest.  Pt states she is limited with left shoulder ROM due to post op restrictions. Pt also reports she had a fall approx 2 weeks ago at home and has some bruising at her back from that fall. Pain Screening  Patient Currently in Pain: Denies  Vital Signs  Patient Currently in Pain: Denies       Orientation  Orientation  Overall Orientation Status: Within Normal Limits     Social/Functional History  Social/Functional History  Lives With: Daughter  Type of Home: House  Home Layout: Two level  Home Access: Level entry  Bathroom Shower/Tub: Walk-in shower  Bathroom Toilet: Handicap height  Bathroom Equipment: Grab bars in shower  Bathroom Accessibility: Accessible  Home Equipment: Roll About, Quad cane  Receives Help From: Family  ADL Assistance: (Reports I )  Homemaking Assistance: (Reports I)  Homemaking Responsibilities: Yes  Transfer Assistance: (SUP)  Active : No  Occupation: Retired  Type of occupation:      Objective     Observation/Palpation  Observation: Pt supine in bed; Room air; NAD; Pleasant & cooperative. Pacemaker placement incision has mild separation and erythema.     AROM RLE (degrees)  RLE AROM: WNL  AROM LLE (degrees)  LLE AROM : WNL     Strength RLE  Strength RLE: WFL  Strength LLE  Strength LLE: WFL     Tone RLE  RLE Tone: Normotonic  Tone LLE  LLE Tone: Normotonic     Bed mobility  Rolling to Left: Modified independent  Rolling to Right: Modified independent  Supine to Sit: Modified independent  Sit to Supine: Modified independent  Scooting: Modified independent     Transfers  Sit to Stand: Stand by assistance  Stand to sit: Stand by assistance     Ambulation  Ambulation?: Yes  Ambulation 1  Surface: level tile  Device: Small Pollo Maxi  Assistance: Stand by assistance  Gait Deviations: Slow Marly;Decreased step length;Decreased step height  Distance: 75 feet     Balance  Sitting - Static: Good  Sitting - Dynamic: Good  Standing - Static: Good;-  Standing - Dynamic: Good;-        Plan   Plan  Times per week: 4-5 days per week   Plan weeks: 3 days  Current Treatment Recommendations: Strengthening, ROM, Balance Training, Functional Mobility Training, Safety Education & Training  Safety Devices  Type of devices: Call light within reach, Left in bed      Goals  Short term goals  Time Frame for Short term goals: 3 days  Short term goal 1: Pt to ambulate 150 feet w/ quad cane with good safety awareness. Short term goal 2: Pt to demonstrate good safety with all functional mobility. Therapy Time   Individual Concurrent Group Co-treatment   Time In 1550         Time Out 65         Minutes NALLELY Hamilton       This note serves as D/C summary if patient is discharged prior to next visit.

## 2020-07-01 VITALS
SYSTOLIC BLOOD PRESSURE: 158 MMHG | WEIGHT: 209.1 LBS | RESPIRATION RATE: 18 BRPM | HEART RATE: 77 BPM | OXYGEN SATURATION: 97 % | DIASTOLIC BLOOD PRESSURE: 80 MMHG | TEMPERATURE: 98.1 F | BODY MASS INDEX: 31.79 KG/M2

## 2020-07-01 PROBLEM — R41.3 MEMORY PROBLEM: Status: ACTIVE | Noted: 2020-07-01

## 2020-07-01 PROBLEM — I10 BENIGN ESSENTIAL HTN: Status: ACTIVE | Noted: 2020-07-01

## 2020-07-01 PROBLEM — Z86.59 HISTORY OF MAJOR DEPRESSION: Status: ACTIVE | Noted: 2020-07-01

## 2020-07-01 PROBLEM — Z86.79 H/O SICK SINUS SYNDROME: Status: ACTIVE | Noted: 2020-07-01

## 2020-07-01 PROBLEM — Z86.59 HISTORY OF BIPOLAR DISORDER: Status: ACTIVE | Noted: 2020-07-01

## 2020-07-01 LAB
AMMONIA: 35 MCG/DL (ref 19–87)
ANION GAP SERPL CALCULATED.3IONS-SCNC: 10 MMOL/L (ref 3–16)
BUN BLDV-MCNC: 21 MG/DL (ref 6–20)
CALCIUM SERPL-MCNC: 9.7 MG/DL (ref 8.5–10.5)
CHLORIDE BLD-SCNC: 102 MMOL/L (ref 98–107)
CO2: 31 MMOL/L (ref 20–30)
CREAT SERPL-MCNC: 0.9 MG/DL (ref 0.4–1.2)
GFR AFRICAN AMERICAN: >59
GFR NON-AFRICAN AMERICAN: 60
GLUCOSE BLD-MCNC: 107 MG/DL (ref 74–106)
HCT VFR BLD CALC: 39.1 % (ref 37–47)
HEMOGLOBIN: 12.5 G/DL (ref 11.5–16.5)
MCH RBC QN AUTO: 30.8 PG (ref 27–32)
MCHC RBC AUTO-ENTMCNC: 32 G/DL (ref 31–35)
MCV RBC AUTO: 96.3 FL (ref 80–100)
PDW BLD-RTO: 12.4 % (ref 11–16)
PLATELET # BLD: 137 K/UL (ref 150–400)
PMV BLD AUTO: 10.3 FL (ref 6–10)
POTASSIUM SERPL-SCNC: 3.8 MMOL/L (ref 3.4–5.1)
RBC # BLD: 4.06 M/UL (ref 3.8–5.8)
SODIUM BLD-SCNC: 143 MMOL/L (ref 136–145)
WBC # BLD: 7.2 K/UL (ref 4–11)

## 2020-07-01 PROCEDURE — 80048 BASIC METABOLIC PNL TOTAL CA: CPT

## 2020-07-01 PROCEDURE — 85027 COMPLETE CBC AUTOMATED: CPT

## 2020-07-01 PROCEDURE — 82140 ASSAY OF AMMONIA: CPT

## 2020-07-01 PROCEDURE — 99235 HOSP IP/OBS SAME DATE MOD 70: CPT | Performed by: INTERNAL MEDICINE

## 2020-07-01 PROCEDURE — 36415 COLL VENOUS BLD VENIPUNCTURE: CPT

## 2020-07-01 PROCEDURE — 6370000000 HC RX 637 (ALT 250 FOR IP): Performed by: PHYSICIAN ASSISTANT

## 2020-07-01 PROCEDURE — 97530 THERAPEUTIC ACTIVITIES: CPT

## 2020-07-01 PROCEDURE — 6370000000 HC RX 637 (ALT 250 FOR IP): Performed by: INTERNAL MEDICINE

## 2020-07-01 RX ORDER — AMLODIPINE BESYLATE 5 MG/1
5 TABLET ORAL DAILY
Status: DISCONTINUED | OUTPATIENT
Start: 2020-07-02 | End: 2020-07-01

## 2020-07-01 RX ORDER — HYDRALAZINE HYDROCHLORIDE 10 MG/1
10 TABLET, FILM COATED ORAL ONCE
Status: COMPLETED | OUTPATIENT
Start: 2020-07-01 | End: 2020-07-01

## 2020-07-01 RX ORDER — AMLODIPINE BESYLATE 5 MG/1
2.5 TABLET ORAL DAILY
Status: DISCONTINUED | OUTPATIENT
Start: 2020-07-01 | End: 2020-07-01

## 2020-07-01 RX ORDER — MEMANTINE HYDROCHLORIDE 5 MG/1
5 TABLET ORAL DAILY
Status: DISCONTINUED | OUTPATIENT
Start: 2020-07-01 | End: 2020-07-01 | Stop reason: HOSPADM

## 2020-07-01 RX ORDER — AMLODIPINE BESYLATE 5 MG/1
5 TABLET ORAL ONCE
Status: DISCONTINUED | OUTPATIENT
Start: 2020-07-01 | End: 2020-07-01

## 2020-07-01 RX ORDER — MEMANTINE HYDROCHLORIDE 5 MG/1
5 TABLET ORAL DAILY
Qty: 60 TABLET | Refills: 3 | Status: SHIPPED | OUTPATIENT
Start: 2020-07-01 | End: 2020-10-22 | Stop reason: SDUPTHER

## 2020-07-01 RX ORDER — ACETAMINOPHEN 325 MG/1
TABLET ORAL
COMMUNITY
End: 2021-03-08

## 2020-07-01 RX ORDER — AMLODIPINE BESYLATE 5 MG/1
2.5 TABLET ORAL ONCE
Status: DISCONTINUED | OUTPATIENT
Start: 2020-07-02 | End: 2020-07-01

## 2020-07-01 RX ORDER — ARIPIPRAZOLE 5 MG/1
5 TABLET ORAL DAILY
Qty: 30 TABLET | Refills: 3 | Status: SHIPPED | OUTPATIENT
Start: 2020-07-01 | End: 2020-10-22 | Stop reason: SDUPTHER

## 2020-07-01 RX ORDER — AMLODIPINE BESYLATE 5 MG/1
10 TABLET ORAL DAILY
Status: DISCONTINUED | OUTPATIENT
Start: 2020-07-02 | End: 2020-07-01

## 2020-07-01 RX ORDER — IBUPROFEN 600 MG/1
TABLET ORAL
COMMUNITY
End: 2020-08-21

## 2020-07-01 RX ORDER — AMLODIPINE BESYLATE 5 MG/1
5 TABLET ORAL ONCE
Status: DISCONTINUED | OUTPATIENT
Start: 2020-07-02 | End: 2020-07-01 | Stop reason: HOSPADM

## 2020-07-01 RX ORDER — AMLODIPINE BESYLATE 5 MG/1
5 TABLET ORAL ONCE
Qty: 30 TABLET | Refills: 3 | Status: SHIPPED | OUTPATIENT
Start: 2020-07-02 | End: 2020-10-22 | Stop reason: SDUPTHER

## 2020-07-01 RX ORDER — AMLODIPINE BESYLATE 5 MG/1
2.5 TABLET ORAL ONCE
Status: DISCONTINUED | OUTPATIENT
Start: 2020-07-01 | End: 2020-07-01

## 2020-07-01 RX ORDER — CITALOPRAM 20 MG/1
20 TABLET ORAL DAILY
Qty: 30 TABLET | Refills: 3 | Status: CANCELLED | OUTPATIENT
Start: 2020-07-01

## 2020-07-01 RX ADMIN — DULOXETINE HYDROCHLORIDE 60 MG: 30 CAPSULE, DELAYED RELEASE ORAL at 07:41

## 2020-07-01 RX ADMIN — PANTOPRAZOLE SODIUM 40 MG: 40 TABLET, DELAYED RELEASE ORAL at 05:55

## 2020-07-01 RX ADMIN — AMLODIPINE BESYLATE 2.5 MG: 5 TABLET ORAL at 07:40

## 2020-07-01 RX ADMIN — PROPRANOLOL HYDROCHLORIDE 20 MG: 20 TABLET ORAL at 07:41

## 2020-07-01 RX ADMIN — MELATONIN 2000 UNITS: at 07:41

## 2020-07-01 RX ADMIN — ASPIRIN 81 MG 81 MG: 81 TABLET ORAL at 07:40

## 2020-07-01 RX ADMIN — CITALOPRAM HYDROBROMIDE 40 MG: 20 TABLET ORAL at 07:41

## 2020-07-01 RX ADMIN — FUROSEMIDE 40 MG: 40 TABLET ORAL at 07:40

## 2020-07-01 RX ADMIN — HYDRALAZINE HYDROCHLORIDE 10 MG: 10 TABLET, FILM COATED ORAL at 01:47

## 2020-07-01 RX ADMIN — MEMANTINE HYDROCHLORIDE 5 MG: 5 TABLET ORAL at 11:38

## 2020-07-01 NOTE — PLAN OF CARE
Problem: Falls - Risk of:  Goal: Will remain free from falls  Description: Will remain free from falls  6/30/2020 1351 by Mel Black RN  Outcome: Ongoing  Goal: Absence of physical injury  Description: Absence of physical injury  6/30/2020 1351 by Mel Black RN  Outcome: Ongoing     Problem: Cardiovascular  Goal: No DVT, peripheral vascular complications  Outcome: Ongoing  Goal: Weight maintained or lost  Outcome: Ongoing  Goal: Understanding of dietary restrictions  Outcome: Ongoing     Problem: Respiratory  Goal: No pulmonary complications  Outcome: Ongoing  Goal: O2 Sat > 90%  Outcome: Ongoing

## 2020-07-01 NOTE — FLOWSHEET NOTE
06/30/20 2100   Assessment   Charting Type Shift assessment   Neurological   Neuro (WDL) WDL   [place] Coma Scale   Eye Opening 4   Best Verbal Response 5   Best Motor Response 6   Elisabeth Coma Scale Score 15   Respiratory   Respiratory (WDL) WDL   Cardiac   Cardiac (WDL) X   Cardiac Regularity Regular   Heart Sounds No adventitious heart sounds   Pacemaker   Pacemaker Yes   Pacemaker Type Permanent   Pacemaker Location Left subclavian   Gastrointestinal   Abdominal (WDL) WDL   Peripheral Vascular   Peripheral Vascular (WDL) X   Edema Right lower extremity; Left lower extremity   RLE Edema +2   LLE Edema +2   RUE Neurovascular Assessment   Capillary Refill Less than/equal to 3 seconds   Color Pink   Temperature Warm   LUE Neurovascular Assessment   Capillary Refill Less than/equal to 3 seconds   Color Pink   Temperature Warm   RLE Neurovascular Assessment   Capillary Refill Less than/equal to 3 seconds   Color Pink   Temperature Warm   LLE Neurovascular Assessment   Capillary Refill Less than/equal to 3 seconds   Color Pink   Temperature Warm   Skin Color/Condition   Skin Color/Condition (WDL) X   Skin Color Pale   Skin Condition/Temp Dry; Warm   Skin Integrity   Skin Integrity (WDL) X   Skin Integrity Bruising   Location Left Side of Upper Body    Preventative Dressing No   Multiple Skin Integrity Sites Yes   Musculoskeletal   Musculoskeletal (WDL) WDL   Genitourinary   Genitourinary (WDL) WDL   Psychosocial   Psychosocial (WDL) WDL

## 2020-07-01 NOTE — DISCHARGE SUMMARY
Short Stay Summary      Patient ID: Xiomy Hudson      Patient's PCP: Terence Payne    Admit Date: 2020     Discharge Date:   2020    Admitting Physician: Sia Noland MD    Discharge Physician: NANDO Wharton     Reason for this admission:   Memory problem    Discharge Diagnoses: Active Hospital Problems    Diagnosis Date Noted    History of major depression [Z86.59] 2020    Benign essential HTN [I10] 2020    H/O sick sinus syndrome [Z86.79] 2020    Memory problem [R41.3] 2020       Procedures:  CT HEAD WO CONTRAST   Final Result      No acute intracranial abnormality. Consults:   IP CONSULT TO RESPIRATORY CARE  IP CONSULT TO HOME CARE NEEDS  PT/OT    HISTORY OF PRESENT ILLNESS:   The patient is a 78 y.o. female with PMH of sick sinus syndrome s/p pacemaker placement, depression, bipolar disorder who was directly admitted by her PCP for altered mental status. Per PCP David Jean patient has been agitated and at times combative with her family at home. She lives with her daughter Nancy Bailey and son in law Sanjeev Lee. Néstor Pineda does home visits for this patient and says at time of last visit the patient was having an episode of agitation and confusion. She was sitting in her room with the lights off and had not bathed in 6 days. Family requested admission for altered mental status and nursing home placement. On arrival to the hospital patient is clean and well kempt. Alert and oriented x 3. No agitation, combativeness, mood problem. She states she has had difficulty with short term memory such as remembering names for her entire life and her short term memory has worsened over last several months. She says she and her family have some private issues they are working on but states \"my daughter thinks I'm going crazy and I'm not. \" Patient admits that she is not a happy person and worries she will never be happy especially now that her   about 1 year ago.  They were  more than 60 years. No acute medical complaint. Patient has not been taking medications as prescribed by neurologist. Patient's daughter states they stopped the aricept after taking for 2-3 nights due to worsening behavior. Also did not increase the citalopram as recommended. She took seroquel twice before coming to the hospital. Patient states it makes her too sleepy and she doesn't want to take it. Per Narinder Bartholomew the patient's daughter the patient has had psychiatric issues such as bipolar disorder and major depressive disorder that have not been appropriately addressed in the past and she wants help adjusting the patient's medications. Review of system  Constitutional:  Denies fever or chills. Eyes:  Denies change in visual acuity or discharge. HENT:  Denies nasal congestion or sore throat. Respiratory:  Denies cough or shortness of breath. Cardiovascular:  Denies chest pain, palpitation or swelling in LEs. GI:  Denies abdominal pain, nausea, vomiting, bloody stools or diarrhea. :  Denies dysuria or frequency. Musculoskeletal:  Denies back pain or joint pain. Integument:  Denies rash or itching. Neurologic:  Denies headache, focal weakness or sensory changes. Psychiatric:  Denies anxiety. Admits to depression. Past Medical History:      Diagnosis Date    Arthritis     Fibromyalgia     Hypertension     Osteoporosis        Past Surgical History:      Procedure Laterality Date    BACK SURGERY      COLONOSCOPY N/A 10/3/2019    COLONOSCOPY POLYPECTOMY SNARE/COLD BIOPSY performed by Sanchez Jimenez MD at [facility] Left     knee surgery    PACEMAKER PLACEMENT      replaced on 06/24/20    SHOULDER SURGERY Right        Social History:   TOBACCO:   reports that she has never smoked. She has never used smokeless tobacco.  ETOH:   reports no history of alcohol use. OCCUPATION:  None     Family History:   History reviewed.  No pertinent family history. Allergies:  Pcn [penicillins] and Tetanus toxoids    Medications Prior to Admission:    Prior to Admission medications    Medication Sig Start Date End Date Taking? Authorizing Provider   acetaminophen (TYLENOL) 325 MG tablet Take one tablet by mouth every 6 hours for five days. Take in alternating fashion with Ibuprofen. Yes Historical Provider, MD   ibuprofen (ADVIL;MOTRIN) 600 MG tablet Take one tablet by mouth every 6 hours as needed for five days. Take in alternating fashion with acetaminophen. Yes Historical Provider, MD   amLODIPine (NORVASC) 5 MG tablet Take 1 tablet by mouth once for 1 dose 7/2/20 7/2/20 Yes NANDO Loza   memantine [facility]) 5 MG tablet Take 1 tablet by mouth daily 7/1/20  Yes NANDO Loza   ARIPiprazole (ABILIFY) 5 MG tablet Take 1 tablet by mouth daily 7/1/20  Yes NANDO Loza   NONFORMULARY CBD gummy bear- Chew one gummy bear (30mg) by moth nightly   Yes Historical Provider, MD   DULoxetine (CYMBALTA) 60 MG extended release capsule Take 60 mg by mouth daily   Yes Historical Provider, MD   omeprazole (PRILOSEC) 20 MG delayed release capsule Take 20 mg by mouth Daily   Yes Historical Provider, MD   citalopram (CELEXA) 20 MG tablet Take 20 mg by mouth daily   Yes Historical Provider, MD   furosemide (LASIX) 40 MG tablet Take 40 mg by mouth daily   Yes Historical Provider, MD   Cholecalciferol (VITAMIN D3) 50 MCG (2000 UT) CAPS Take 1 capsule by mouth daily    Yes Historical Provider, MD   aspirin 81 MG chewable tablet Take 81 mg by mouth daily   Yes Historical Provider, MD   ALPRAZolam (XANAX) 0.5 MG tablet Take 0.5 mg by mouth nightly.     Yes Historical Provider, MD   Multiple Vitamins-Minerals (THERAPEUTIC MULTIVITAMIN-MINERALS) tablet Take 2 tablets by mouth daily    Yes Historical Provider, MD   propranolol (INDERAL LA) 60 MG extended release capsule Take 60 mg by mouth daily   Yes Historical Provider, MD   gabapentin (NEURONTIN) 300 MG capsule Take 300 mg by mouth nightly   Yes Historical Provider, MD   docusate sodium (COLACE) 100 MG capsule Take 1 capsule by mouth 2 times daily as needed for Constipation  Patient taking differently: Take 100 mg by mouth daily  11/8/16  Yes Ruben Sharp, DO       Vital Signs  Temp: 98.1 °F (36.7 °C)  Pulse: 77  Resp: 18  BP: (!) 158/80(manual )  SpO2: 97 %  O2 Device: None (Room air)       Vital signs reviewed in electronic chart. Physical exam  Constitutional:  Well developed, well nourished, no acute distress. Eyes:  PERRL, conjunctiva normal, EOMI. HENT:  Atraumatic, external ears normal, external nose/nares normal, oropharynx moist, no pharyngeal exudates. Neck:  Supple. No JVD or thyromegaly. Respiratory:  No respiratory distress, normal breath sounds, no rales, no wheezing. Cardiovascular:  Normal rate, normal rhythm, no murmurs, no gallops, no rubs. GI:  Soft, nondistended, normal bowel sounds, nontender, no organomegaly, no mass. :  No costovertebral angle tenderness. Musculoskeletal:  No edema, no tenderness, no obvious deformities. Patient is moving all extremities. Integument:  Well hydrated, no rash. Well healing incision left upper chest s/p pacemaker generator change. Neurologic:  Alert & oriented x 3,  no focal deficits noted. Strength is equal throughout. Answers all questions appropriately. Psychiatric:  Speech and behavior appropriate.       Lab Results   Component Value Date    WBC 7.2 07/01/2020    HGB 12.5 07/01/2020    HCT 39.1 07/01/2020    MCV 96.3 07/01/2020     (L) 07/01/2020       Lab Results   Component Value Date     07/01/2020    K 3.8 07/01/2020     07/01/2020    CO2 31 (H) 07/01/2020    BUN 21 (H) 07/01/2020    CREATININE 0.9 07/01/2020    GLUCOSE 107 (H) 07/01/2020    CALCIUM 9.7 07/01/2020    PROT 6.6 06/30/2020    LABALBU 4.0 06/30/2020    BILITOT <0.2 (L) 06/30/2020    ALKPHOS 116 (H) 06/30/2020    AST 13 06/30/2020    ALT 9 06/30/2020    LABGLOM 60 07/01/2020    GFRAA >59 07/01/2020    AGRATIO 1.5 06/30/2020    GLOB 2.6 06/30/2020         Assessment and Plan/Hospital course: Active Hospital Problems    Diagnosis Date Noted    History of major depression [Z86.59]  See under bipolar disorder 07/01/2020    Benign essential HTN [I10]  BP elevated. Add amlodipine to regimen. Follow up with pcp.  07/01/2020    H/O sick sinus syndrome [Z86.79]  S/p recent pacemaker generator change. Follows with Dr. Chloe Noble as outpatient. 07/01/2020    Memory problem [R41.3]  Patient was directly admitted due to altered mental status however no evidence of altered mental status on arrival. CT head, cbc, cmp, b12, folate, vitamin d, tsh, ammonia level , UA reflex to culture within normal limits. No confusion or evidence of sundowning with overnight monitoring. Following with Dr. Amada Saenz neurologist in [address] [place] [address] and first seen 6/3. Note reviewed. Recommended she start aricept and increase citalopram. Family states she was more agitated with aricept and they stopped it. Also per family they did not increase citalopram for unknown reasons. Per OT cognition assessment \"ACL indicates level fo 4.8 meaning patient may live alone with daily assistance to monitor safety and check problem solving methods. \" Family notified of this. Patient is alert and oriented x 3 while inpatient and does admit to short term memory loss. Recommend remaining off of aricept due to adverse effect. Start namenda. F/u closely with pcp and neurology. 07/01/2020    History of bipolar disorder [Z86.59]  Family reprot history of bipolar disorder and major depressive disorder. Patient admits to depression and is tearful on exam. Current regimen not effective at this time and patient does not want to take seroquel due to drowsiness. Discussed with pharmacy and abilify added to citalopram. Family agreeable.  Thoroughly encouraged patient and family to follow up with psychiatry and PCP as outpatient and to be compliant with medications as ordered. 07/01/2020     Of note, I spent approximately 90 minutes discussing this case with patient and her family (daughter Anu Kimbrough and son in law AHSBY).  Stevenson Schneider also involved in conversations. Disposition: home    Discharged Condition: Stable    Activity: activity as tolerated  Diet: cardiac diet  Follow Up: Primary Care Physician in one week. Follow up with psychiatry in 2 weeks. Please take medications as prescribed    Discharge Medications:      Current Discharge Medication List           Details   amLODIPine (NORVASC) 5 MG tablet Take 1 tablet by mouth once for 1 dose  Qty: 30 tablet, Refills: 3      memantine (NAMENDA) 5 MG tablet Take 1 tablet by mouth daily  Qty: 60 tablet, Refills: 3      ARIPiprazole (ABILIFY) 5 MG tablet Take 1 tablet by mouth daily  Qty: 30 tablet, Refills: 3              Details   acetaminophen (TYLENOL) 325 MG tablet Take one tablet by mouth every 6 hours for five days. Take in alternating fashion with Ibuprofen. ibuprofen (ADVIL;MOTRIN) 600 MG tablet Take one tablet by mouth every 6 hours as needed for five days. Take in alternating fashion with acetaminophen. NONFORMULARY CBD gummy bear- Chew one gummy bear (30mg) by moth nightly      DULoxetine (CYMBALTA) 60 MG extended release capsule Take 60 mg by mouth daily      omeprazole (PRILOSEC) 20 MG delayed release capsule Take 20 mg by mouth Daily      citalopram (CELEXA) 20 MG tablet Take 20 mg by mouth daily      furosemide (LASIX) 40 MG tablet Take 40 mg by mouth daily      Cholecalciferol (VITAMIN D3) 50 MCG (2000 UT) CAPS Take 1 capsule by mouth daily       aspirin 81 MG chewable tablet Take 81 mg by mouth daily      ALPRAZolam (XANAX) 0.5 MG tablet Take 0.5 mg by mouth nightly.        Multiple Vitamins-Minerals (THERAPEUTIC MULTIVITAMIN-MINERALS) tablet Take 2 tablets by mouth daily       propranolol (INDERAL LA) 60 MG extended release capsule Take 60 mg by mouth daily gabapentin (NEURONTIN) 300 MG capsule Take 300 mg by mouth nightly      docusate sodium (COLACE) 100 MG capsule Take 1 capsule by mouth 2 times daily as needed for Constipation  Qty: 30 capsule, Refills: 0            Patient was seen and examined by Dr. Ellen Chavez and plan of care reviewed. Signed:  Electronically signed by NANDO Vidal on 7/1/2020 at 1:28 PM       Thank you Olya Garay for the opportunity to be involved in this patient's care. If you have any questions or concerns please feel free to contact me at [phone].

## 2020-07-01 NOTE — CARE COORDINATION
Patient Information     Patient Name  Leidy Culver ([id number]) Sex  Female   1940     Patient Demographics     Address  [address]  [phone] [facility])  [phone] [facility]     [address]     [address]        Emergency Contact(s)     Name Luisana 16 Child [phone]     Becki Pump Other   [phone]

## 2020-07-01 NOTE — H&P
Short Stay Summary      Patient ID: Tejinder Rodríguez      Patient's PCP: Ely Osorio    Admit Date: 2020     Discharge Date:   2020    Admitting Physician: Priyanka Hdz MD    Discharge Physician: NANDO Combs     Reason for this admission:   Memory problem    Discharge Diagnoses: Active Hospital Problems    Diagnosis Date Noted    History of major depression [Z86.59] 2020    Benign essential HTN [I10] 2020    H/O sick sinus syndrome [Z86.79] 2020    Memory problem [R41.3] 2020       Procedures:  CT HEAD WO CONTRAST   Final Result      No acute intracranial abnormality. Consults:   IP CONSULT TO RESPIRATORY CARE  IP CONSULT TO HOME CARE NEEDS  PT/OT    HISTORY OF PRESENT ILLNESS:   The patient is a 78 y.o. female with PMH of sick sinus syndrome s/p pacemaker placement, depression, bipolar disorder who was directly admitted by her PCP for altered mental status. Per PCP Mary Beth Conner patient has been agitated and at times combative with her family at home. She lives with her daughter Bernardo Lindsey and son in [facility]. Dilan Vincent does home visits for this patient and says at time of last visit the patient was having an episode of agitation and confusion. She was sitting in her room with the lights off and had not bathed in 6 days. Family requested admission for altered mental status and nursing home placement. On arrival to the hospital patient is clean and well kempt. Alert and oriented x 3. No agitation, combativeness, mood problem. She states she has had difficulty with short term memory such as remembering names for her entire life and her short term memory has worsened over last several months. She says she and her family have some private issues they are working on but states \"my daughter thinks I'm going crazy and I'm not. \" Patient admits that she is not a happy person and worries she will never be happy especially now that her   about 1 year ago.  They were  more than 60 years. No acute medical complaint. Patient has not been taking medications as prescribed by neurologist. Patient's daughter states they stopped the aricept after taking for 2-3 nights due to worsening behavior. Also did not increase the citalopram as recommended. She took seroquel twice before coming to the hospital. Patient states it makes her too sleepy and she doesn't want to take it. Per Fady Luceros the patient's daughter the patient has had psychiatric issues such as bipolar disorder and major depressive disorder that have not been appropriately addressed in the past and she wants help adjusting the patient's medications. Review of system  Constitutional:  Denies fever or chills. Eyes:  Denies change in visual acuity or discharge. HENT:  Denies nasal congestion or sore throat. Respiratory:  Denies cough or shortness of breath. Cardiovascular:  Denies chest pain, palpitation or swelling in LEs. GI:  Denies abdominal pain, nausea, vomiting, bloody stools or diarrhea. :  Denies dysuria or frequency. Musculoskeletal:  Denies back pain or joint pain. Integument:  Denies rash or itching. Neurologic:  Denies headache, focal weakness or sensory changes. Psychiatric:  Denies anxiety. Admits to depression. Past Medical History:      Diagnosis Date    Arthritis     Fibromyalgia     Hypertension     Osteoporosis        Past Surgical History:      Procedure Laterality Date    BACK SURGERY      COLONOSCOPY N/A 10/3/2019    COLONOSCOPY POLYPECTOMY SNARE/COLD BIOPSY performed by Terence Hernandez MD at [facility] Left     knee surgery    PACEMAKER PLACEMENT      replaced on 06/24/20    SHOULDER SURGERY Right        Social History:   TOBACCO:   reports that she has never smoked. She has never used smokeless tobacco.  ETOH:   reports no history of alcohol use. OCCUPATION:  None     Family History:   History reviewed.  No pertinent family history. Allergies:  Pcn [penicillins] and Tetanus toxoids    Medications Prior to Admission:    Prior to Admission medications    Medication Sig Start Date End Date Taking? Authorizing Provider   acetaminophen (TYLENOL) 325 MG tablet Take one tablet by mouth every 6 hours for five days. Take in alternating fashion with Ibuprofen. Yes Historical Provider, MD   ibuprofen (ADVIL;MOTRIN) 600 MG tablet Take one tablet by mouth every 6 hours as needed for five days. Take in alternating fashion with acetaminophen. Yes Historical Provider, MD   amLODIPine (NORVASC) 5 MG tablet Take 1 tablet by mouth once for 1 dose 7/2/20 7/2/20 Yes NANDO Porter   memantine [facility]) 5 MG tablet Take 1 tablet by mouth daily 7/1/20  Yes NANDO Porter   ARIPiprazole (ABILIFY) 5 MG tablet Take 1 tablet by mouth daily 7/1/20  Yes NANDO Porter   NONFORMULARY CBD gummy bear- Chew one gummy bear (30mg) by moth nightly   Yes Historical Provider, MD   DULoxetine (CYMBALTA) 60 MG extended release capsule Take 60 mg by mouth daily   Yes Historical Provider, MD   omeprazole (PRILOSEC) 20 MG delayed release capsule Take 20 mg by mouth Daily   Yes Historical Provider, MD   citalopram (CELEXA) 20 MG tablet Take 20 mg by mouth daily   Yes Historical Provider, MD   furosemide (LASIX) 40 MG tablet Take 40 mg by mouth daily   Yes Historical Provider, MD   Cholecalciferol (VITAMIN D3) 50 MCG (2000 UT) CAPS Take 1 capsule by mouth daily    Yes Historical Provider, MD   aspirin 81 MG chewable tablet Take 81 mg by mouth daily   Yes Historical Provider, MD   ALPRAZolam (XANAX) 0.5 MG tablet Take 0.5 mg by mouth nightly.     Yes Historical Provider, MD   Multiple Vitamins-Minerals (THERAPEUTIC MULTIVITAMIN-MINERALS) tablet Take 2 tablets by mouth daily    Yes Historical Provider, MD   propranolol (INDERAL LA) 60 MG extended release capsule Take 60 mg by mouth daily   Yes Historical Provider, MD   gabapentin (NEURONTIN) 300 MG capsule 60 07/01/2020    GFRAA >59 07/01/2020    AGRATIO 1.5 06/30/2020    GLOB 2.6 06/30/2020         Assessment and Plan/Hospital course: Active Hospital Problems    Diagnosis Date Noted    History of major depression [Z86.59]  See under bipolar disorder 07/01/2020    Benign essential HTN [I10]  BP elevated. Add amlodipine to regimen. Follow up with pcp.  07/01/2020    H/O sick sinus syndrome [Z86.79]  S/p recent pacemaker generator change. Follows with Dr. Modesto Barbosa as outpatient. 07/01/2020    Memory problem [R41.3]  Patient was directly admitted due to altered mental status however no evidence of altered mental status on arrival. CT head, cbc, cmp, b12, folate, vitamin d, tsh, ammonia level , UA reflex to culture within normal limits. No confusion or evidence of sundowning with overnight monitoring. Following with Dr. Zelalem Mata neurologist in [facility] and first seen 6/3. Note reviewed. Recommended she start aricept and increase citalopram. Family states she was more agitated with aricept and they stopped it. Also per family they did not increase citalopram for unknown reasons. Per OT cognition assessment \"ACL indicates level fo 4.8 meaning patient may live alone with daily assistance to monitor safety and check problem solving methods. \" Family notified of this. Patient is alert and oriented x 3 while inpatient and does admit to short term memory loss. Recommend remaining off of aricept due to adverse effect. Start namenda. F/u closely with pcp and neurology. 07/01/2020    History of bipolar disorder [Z86.59]  Family reprot history of bipolar disorder and major depressive disorder. Patient admits to depression and is tearful on exam. Current regimen not effective at this time and patient does not want to take seroquel due to drowsiness. Discussed with pharmacy and abilify added to citalopram. Family agreeable.  Thoroughly encouraged patient and family to follow up with psychiatry and PCP as outpatient and to be gabapentin (NEURONTIN) 300 MG capsule Take 300 mg by mouth nightly      docusate sodium (COLACE) 100 MG capsule Take 1 capsule by mouth 2 times daily as needed for Constipation  Qty: 30 capsule, Refills: 0            Patient was seen and examined by Dr. Aggie Canales and plan of care reviewed. Signed:  Electronically signed by NANDO Godoy on 7/1/2020 at 1:28 PM       Thank you Janice Buck for the opportunity to be involved in this patient's care. If you have any questions or concerns please feel free to contact me at [phone].

## 2020-07-01 NOTE — FLOWSHEET NOTE
07/01/20 0054   Vitals   Temp 97.9 °F (36.6 °C)   Temp Source Temporal   Pulse 75   Resp 19   BP (!) 178/85   MAP (mmHg) 107   SpO2 96 %   Spoke with on call provider concerning patient BP. See eMAR for new orders.

## 2020-07-01 NOTE — CARE COORDINATION
Spoke with pt at [facility] in detail. Pt is agreeable to [facility] (when it opens) and [facility]. Pt is wanting to go home today and feels comfortable with returning. PA and I spoke with daughter on the phone. She is agreeable to bring pt home with afternoon. Agreeable to [facility] at home and wants the one with a girl named Lupe Avitia. [facility] has a girl named Lupe Avitia that comes to [place]. Family is requesting [facility] as well. Referral sent to [facility] . No DME needs noted.

## 2020-07-01 NOTE — PROGRESS NOTES
Occupational Therapy  Facility/Department: [facility] MED SURG  Daily Treatment Note  NAME: Kali Barbosa  : 1940  MRN: [id number]    Date of Service: 2020    Discharge Recommendations:  Continue to assess pending progress     Assessment   Assessment: Pt seen for skilled OT interventions. Was going to attempt shower with pt but did not occur due to discharge planning. Pt performed bed mobility to EOB with Mod I with good sitting balance. OT collaborated with pt, care manager, & PA about d/c planning. Pt main concerns are not functional abilities but rather mental health. Pt reports feelings of depression about passing of  & other family issues. Pt plans to return to living with family. OT suggested possibly [facility] adult day care center during the day once reopened to decrease self isolation at home & for socializing with other individuals for support & to promote positive quality of life. Pt continued talking with team about d/c planning upon exit. Activity Tolerance  Activity Tolerance: Patient Tolerated treatment well       Patient Diagnosis(es): The encounter diagnosis was History of major depression. has a past medical history of Arthritis, Fibromyalgia, Hypertension, and Osteoporosis. has a past surgical history that includes pacemaker placement; Hysterectomy; back surgery; joint replacement (Left); shoulder surgery (Right); and Colonoscopy (N/A, 10/3/2019).     Restrictions  Restrictions/Precautions  Restrictions/Precautions: Cardiac, Fall Risk, General Precautions(recent pacemaker placement)  Required Braces or Orthoses?: Yes(Has knee brace; noncompliant with use)  Subjective   General  Chart Reviewed: Yes  Patient assessed for rehabilitation services?: Yes  Family / Caregiver Present: No  Referring Practitioner: NANDO Solis  Diagnosis: CHF      Objective    Bed mobility  Rolling to Left: Modified independent  Rolling to Right: Modified independent  Supine to Sit: Modified

## 2020-07-01 NOTE — PROGRESS NOTES
Medication Reconciliation  Med rec performed for pt utilizing list provided by family and fill history from [facility]. Also spoke with pt's son in law as well. Changes made to med list are below:  -Added tylenol and ibuprofen 5 day rx's to home med list as pt filled them on 6/24/2020 and pt was taking them prior to admission per son in law [address] with Quynh Goldsmith that he gave the pt Citalopram 20mg daily at home. She was prescribed 40mg but per family she wasn't tolerating either it or Donepezil so neurologist recommended he cut it and half.   -Left donepezil on home med list but did not check it as taking since they have filled it at home pharmacy but per son in law have not been giving it for about a month when the citalopram dose was cut in half. Per son in law he stated that neurologist recommended she be on this medication again in addition to Seroquel after recent CT scan.  -Changed docusate frequency from BID to daily per home med list.  Son in law also confirmed that he gives it to pt once daily.  -Changed Cbd product from the oil to gummy bears as son in law stated that he gives the pt gummy bear 30mg every night.  -Removed \"prn\" from Xanax frequency as the rx was for nightly scheduled and son in law confirmed he gave this med to the pt every night.  -Updated multivitamin dose from 1 to 2 tabs as son in law stated the pt gets two \"Vitafusion gummies\" at home every day.     Shirley Lakhani, PharmD

## 2020-08-13 ENCOUNTER — TELEPHONE (OUTPATIENT)
Dept: PRIMARY CARE CLINIC | Age: 80
End: 2020-08-13

## 2020-08-13 NOTE — TELEPHONE ENCOUNTER
Pt has new pt appt scheduled on 8/20/20, but pts daughter called asking if you could suggest anything for sleep until then.  States you cared for her in the hospital lately

## 2020-08-20 NOTE — PROGRESS NOTES
Chief Complaint   Patient presents with   [address]       Have you seen any other physician or provider since your last visit yes - Previous PCP [facility]    Have you had any other diagnostic tests since your last visit? yes - Labs & Radiology as Inpatient    Have you changed or stopped any medications since your last visit? Yes - Dr. Toshia Guzman added the [place] Patch       Diabetic retinal exam completed this year?  Yes - Provider in [place]                       * If yes please have patient sign a records release to obtain record to update Health Maintenance

## 2020-08-21 ENCOUNTER — OFFICE VISIT (OUTPATIENT)
Dept: PRIMARY CARE CLINIC | Age: 80
End: 2020-08-21
Payer: MEDICARE

## 2020-08-21 VITALS
SYSTOLIC BLOOD PRESSURE: 134 MMHG | TEMPERATURE: 98.1 F | HEART RATE: 76 BPM | BODY MASS INDEX: 31.87 KG/M2 | WEIGHT: 209.6 LBS | DIASTOLIC BLOOD PRESSURE: 72 MMHG | OXYGEN SATURATION: 95 %

## 2020-08-21 PROCEDURE — G8400 PT W/DXA NO RESULTS DOC: HCPCS | Performed by: INTERNAL MEDICINE

## 2020-08-21 PROCEDURE — G0009 ADMIN PNEUMOCOCCAL VACCINE: HCPCS | Performed by: INTERNAL MEDICINE

## 2020-08-21 PROCEDURE — 4040F PNEUMOC VAC/ADMIN/RCVD: CPT | Performed by: INTERNAL MEDICINE

## 2020-08-21 PROCEDURE — 1123F ACP DISCUSS/DSCN MKR DOCD: CPT | Performed by: INTERNAL MEDICINE

## 2020-08-21 PROCEDURE — G8427 DOCREV CUR MEDS BY ELIG CLIN: HCPCS | Performed by: INTERNAL MEDICINE

## 2020-08-21 PROCEDURE — 1090F PRES/ABSN URINE INCON ASSESS: CPT | Performed by: INTERNAL MEDICINE

## 2020-08-21 PROCEDURE — 1036F TOBACCO NON-USER: CPT | Performed by: INTERNAL MEDICINE

## 2020-08-21 PROCEDURE — 90732 PPSV23 VACC 2 YRS+ SUBQ/IM: CPT | Performed by: INTERNAL MEDICINE

## 2020-08-21 PROCEDURE — G8417 CALC BMI ABV UP PARAM F/U: HCPCS | Performed by: INTERNAL MEDICINE

## 2020-08-21 PROCEDURE — 99203 OFFICE O/P NEW LOW 30 MIN: CPT | Performed by: INTERNAL MEDICINE

## 2020-08-21 RX ORDER — RIVASTIGMINE 4.6 MG/24H
1 PATCH, EXTENDED RELEASE TRANSDERMAL DAILY
COMMUNITY
End: 2020-10-22 | Stop reason: SDUPTHER

## 2020-08-21 RX ORDER — LORAZEPAM 0.5 MG/1
0.5 TABLET ORAL EVERY 8 HOURS PRN
Qty: 9 TABLET | Refills: 0 | Status: SHIPPED | OUTPATIENT
Start: 2020-08-21 | End: 2020-08-24

## 2020-08-21 ASSESSMENT — ENCOUNTER SYMPTOMS
SINUS PRESSURE: 0
VOMITING: 0
SHORTNESS OF BREATH: 0
BACK PAIN: 0
EYE DISCHARGE: 0
WHEEZING: 0
ABDOMINAL PAIN: 0
SORE THROAT: 0
COUGH: 0
NAUSEA: 0

## 2020-08-21 ASSESSMENT — PATIENT HEALTH QUESTIONNAIRE - PHQ9
SUM OF ALL RESPONSES TO PHQ9 QUESTIONS 1 & 2: 3
SUM OF ALL RESPONSES TO PHQ QUESTIONS 1-9: 3
1. LITTLE INTEREST OR PLEASURE IN DOING THINGS: 1
SUM OF ALL RESPONSES TO PHQ QUESTIONS 1-9: 3
2. FEELING DOWN, DEPRESSED OR HOPELESS: 2

## 2020-08-21 NOTE — PROGRESS NOTES
SUBJECTIVE:    Patient ID: Ara Yu is a 78 y. o.female. Chief Complaint   Patient presents with    Establish Care     HPI:  Patient is here today to establish care. She has a history of depression and anxiety. Her son-in-law is here today with her. He states that she does have problems with mood swings and also has days where she will cry out of nowhere. She was seen at the hospital in June of this year for mental status changes and aggressive behavior toward her family. Brain scans were unremarkable. Patient has been experiencing trouble with memory, mainly short term. Occasional trouble with long term. Sx for the past couple of years. Sx are getting worse. She lives with her daughter and son-in-law. She has been taken good care of. Good appetite. Weight has been stable per [facility] report. Patient has been seeing neurologist for this issue. Patient has had hypertension for several years. She has been compliant with taking medications, without side effects from it. She has been following a low-sodium, is lightly active and rarely exercises. Weight is stable per caregiver report. BP is stable today. Patient was noted to have a slightly elevated glucose on fasting lab. She denies any personal history of diabetes. She does not check her blood sugars. There are no symptoms of hyperglycemia. Patient's medications, allergies, past medical, surgical, social and family histories were reviewed and updated as appropriate in the electronic medical record/chart. Outpatient Medications Marked as Taking for the 8/21/20 encounter (Office Visit) with Cathie Mcclellan MD   Medication Sig Dispense Refill    rivastigmine (EXELON) 4.6 MG/24HR Place 1 patch onto the skin daily      acetaminophen (TYLENOL) 325 MG tablet Take one tablet by mouth every 6 hours for five days. Take in alternating fashion with Ibuprofen.       ibuprofen (ADVIL;MOTRIN) 600 MG tablet Take one tablet by mouth every 6 hours as needed for five days. Take in alternating fashion with acetaminophen.  memantine (NAMENDA) 5 MG tablet Take 1 tablet by mouth daily 60 tablet 3    ARIPiprazole (ABILIFY) 5 MG tablet Take 1 tablet by mouth daily 30 tablet 3    NONFORMULARY CBD gummy bear- Chew one gummy bear (30mg) by moth nightly      DULoxetine (CYMBALTA) 60 MG extended release capsule Take 60 mg by mouth daily      omeprazole (PRILOSEC) 20 MG delayed release capsule Take 20 mg by mouth Daily      citalopram (CELEXA) 20 MG tablet Take 20 mg by mouth daily      furosemide (LASIX) 40 MG tablet Take 40 mg by mouth daily      Cholecalciferol (VITAMIN D3) 50 MCG (2000 UT) CAPS Take 1 capsule by mouth daily       aspirin 81 MG chewable tablet Take 81 mg by mouth daily      ALPRAZolam (XANAX) 0.5 MG tablet Take 0.5 mg by mouth nightly.  Multiple Vitamins-Minerals (THERAPEUTIC MULTIVITAMIN-MINERALS) tablet Take 2 tablets by mouth daily       propranolol (INDERAL LA) 60 MG extended release capsule Take 60 mg by mouth daily      gabapentin (NEURONTIN) 300 MG capsule Take 300 mg by mouth nightly      docusate sodium (COLACE) 100 MG capsule Take 1 capsule by mouth 2 times daily as needed for Constipation (Patient taking differently: Take 100 mg by mouth daily ) 30 capsule 0        Review of Systems   Constitutional: Negative for chills and fever. HENT: Negative for congestion, sinus pressure and sore throat. Eyes: Negative for discharge and visual disturbance. Respiratory: Negative for cough, shortness of breath and wheezing. Cardiovascular: Positive for leg swelling (L leg). Negative for chest pain and palpitations. HAYDEN   Gastrointestinal: Negative for abdominal pain, nausea and vomiting. Endocrine: Negative for cold intolerance and heat intolerance. Genitourinary: Negative for dysuria, frequency and urgency. Musculoskeletal: Positive for arthralgias and gait problem. Negative for back pain.    Skin: Negative for rash and wound. Neurological: Negative for syncope, numbness and headaches. Impaired memory   Hematological: Negative. Psychiatric/Behavioral: Positive for agitation, confusion and sleep disturbance. Negative for self-injury and suicidal ideas. The patient is nervous/anxious. Depression       Past Medical History:   Diagnosis Date    Arthritis     Fibromyalgia     Hypertension     Osteoporosis      Past Surgical History:   Procedure Laterality Date    BACK SURGERY      COLONOSCOPY N/A 10/3/2019    COLONOSCOPY POLYPECTOMY SNARE/COLD BIOPSY performed by Callie Bonilla MD at [facility] Left     knee surgery    PACEMAKER PLACEMENT      replaced on 06/24/20    SHOULDER SURGERY Right       History reviewed. No pertinent family history. Social History     Tobacco Use   Smoking Status Never Smoker   Smokeless Tobacco Never Used       OBJECTIVE:   Wt Readings from Last 3 Encounters:   08/21/20 209 lb 9.6 oz (95.1 kg)   07/01/20 209 lb 1.6 oz (94.8 kg)   10/03/19 210 lb (95.3 kg)     BP Readings from Last 3 Encounters:   08/21/20 134/72   07/01/20 (!) 158/80   10/03/19 (!) 124/57       /72 (Site: Right Upper Arm, Position: Sitting, Cuff Size: Large Adult)   Pulse 76   Temp 98.1 °F (36.7 °C) (Temporal)   Wt 209 lb 9.6 oz (95.1 kg)   SpO2 95% Comment: room air  BMI 31.87 kg/m²      Physical Exam  Vitals signs and nursing note reviewed. Constitutional:       Appearance: Normal appearance. She is well-developed. She is obese. Comments: Using cane   HENT:      Head: Normocephalic and atraumatic. Right Ear: External ear normal.      Left Ear: External ear normal.      Nose: Nose normal.      Mouth/Throat:      Mouth: Mucous membranes are moist.      Pharynx: Oropharynx is clear. Eyes:      Conjunctiva/sclera: Conjunctivae normal.      Pupils: Pupils are equal, round, and reactive to light. Neck:      Musculoskeletal: Neck supple.  No neck rigidity or muscular tenderness. Thyroid: No thyromegaly. Vascular: No JVD. Cardiovascular:      Rate and Rhythm: Normal rate and regular rhythm. Heart sounds: Normal heart sounds. Pulmonary:      Effort: Pulmonary effort is normal.      Breath sounds: Normal breath sounds. No wheezing or rales. Abdominal:      General: Bowel sounds are normal. There is no distension. Palpations: Abdomen is soft. Tenderness: There is no abdominal tenderness. Musculoskeletal:         General: No tenderness. Right lower leg: No edema. Left lower leg: Edema (trace) present. Skin:     Findings: No erythema or rash. Neurological:      General: No focal deficit present. Mental Status: She is alert and oriented to person, place, and time. Comments: Decreased memory   Psychiatric:         Behavior: Behavior normal.         Judgment: Judgment normal.         Lab Results   Component Value Date     07/01/2020    K 3.8 07/01/2020    K 4.0 06/30/2020     07/01/2020    CO2 31 07/01/2020    GLUCOSE 107 07/01/2020    BUN 21 07/01/2020    CREATININE 0.9 07/01/2020    CALCIUM 9.7 07/01/2020    PROT 6.6 06/30/2020    LABALBU 4.0 06/30/2020    BILITOT <0.2 06/30/2020    ALT 9 06/30/2020    AST 13 06/30/2020       Hemoglobin A1C (%)   Date Value   02/25/2020 5.9     Microscopic Examination (no units)   Date Value   06/30/2020 Not Indicated         Lab Results   Component Value Date    WBC 7.2 07/01/2020    NEUTROABS 3.9 06/30/2020    HGB 12.5 07/01/2020    HCT 39.1 07/01/2020    MCV 96.3 07/01/2020     07/01/2020       Lab Results   Component Value Date    TSH 2.24 06/30/2020       ASSESSMENT/PLAN:     1. Benign essential HTN  BP is stable. I have advised her on low-sodium diet, exercise and weight control. I am going to continue current medication.  Will monitor her renal function every few months, have advised her to check blood pressure frequently and to keep a record of this.     - CBC Auto Differential; Future  - Comprehensive Metabolic Panel; Future  - Lipid Panel; Future    2. H/O sick sinus syndrome  S/P pacemaker. Continue follow up with cardiology. She is taking ASA 81 mg daily. Continue on this. 3. Memory problem  Long discussion with pt and the family regarding the process of the disease. I informed them that meds will slow the progression of the disease. Will check MMSE every few months. I will continue on current medication. Patient to continue following up with her neurologist since has been working on her medication including Namenda/Exelon patches  Monitor nutrition status and activity level. Monitor basic lab periodically. Ativan PRN for intermittent anxiety. - CBC Auto Differential; Future  - Comprehensive Metabolic Panel; Future  - LORazepam (ATIVAN) 0.5 MG tablet; Take 1 tablet by mouth every 8 hours as needed for Anxiety for up to 3 days. Dispense: 9 tablet; Refill: 0    4. Hyperglycemia   I have advised her on diet, exercise and weight control. I have also, advised her that this condition could possibly progress into diabetes. I will monitor her A1c periodically. - Hemoglobin A1C; Future    5. BONI on CPAP  Long discussion with pt regarding exercise, weight loss and the improtance of using CPAP on a regular basis. Will monitor closely. Patient has been compliant with CPAP but it is about 8years old and she would like a new one. I will work on getting her a new one. Patient is aware about the risks from BONI (short and long term). 6. Need for prophylactic vaccination against Streptococcus pneumoniae (pneumococcus)  Vaccine was given per request/rec after she was informed about possible SE/reaction to it. - Pneumococcal polysaccharide vaccine 23-valent PPSV23    7. Need for prophylactic vaccination and inoculation against varicella  Rx given today. - zoster recombinant adjuvanted vaccine [facility]) 50 MCG/0.5ML SUSR injection;  Inject 0.5 mLs into the muscle once for 1 dose 50 MCG IM then repeat 2-6 months. Dispense: 1 each; Refill: 1    8. Post-menopausal  DEXA bone scan ordered. Further recommendation based on test results. - DEXA Bone Density Axial Skeleton; Future      Orders Placed This Encounter   Medications    zoster recombinant adjuvanted vaccine (SHINGRIX) 50 MCG/0.5ML SUSR injection     Sig: Inject 0.5 mLs into the muscle once for 1 dose 50 MCG IM then repeat 2-6 months. Dispense:  1 each     Refill:  1    LORazepam (ATIVAN) 0.5 MG tablet     Sig: Take 1 tablet by mouth every 8 hours as needed for Anxiety for up to 3 days. Dispense:  9 tablet     Refill:  0      Written by Surinder Paris, acting as a scribe for Dr. Kevin Ribera on 8/21/2020 at 11:28 AM.     I, Dr. Anish Owens, personally performed the services described in the documentation as scribed by Ruddy Hyatt CMA, in my presence and it is both accurate and complete.

## 2020-08-28 ENCOUNTER — HOSPITAL ENCOUNTER (OUTPATIENT)
Dept: GENERAL RADIOLOGY | Facility: HOSPITAL | Age: 80
Discharge: HOME | End: 2020-08-28
Payer: MEDICARE

## 2020-08-28 ENCOUNTER — HOSPITAL ENCOUNTER (OUTPATIENT)
Facility: HOSPITAL | Age: 80
Discharge: HOME | End: 2020-08-28
Payer: MEDICARE

## 2020-08-28 LAB
A/G RATIO: 1.7 (ref 0.8–2)
ALBUMIN SERPL-MCNC: 4.6 G/DL (ref 3.4–4.8)
ALP BLD-CCNC: 131 U/L (ref 25–100)
ALT SERPL-CCNC: 16 U/L (ref 4–36)
ANION GAP SERPL CALCULATED.3IONS-SCNC: 8 MMOL/L (ref 3–16)
AST SERPL-CCNC: 21 U/L (ref 8–33)
BASOPHILS ABSOLUTE: 0 K/UL (ref 0–0.1)
BASOPHILS RELATIVE PERCENT: 0.3 %
BILIRUB SERPL-MCNC: 0.4 MG/DL (ref 0.3–1.2)
BUN BLDV-MCNC: 15 MG/DL (ref 6–20)
CALCIUM SERPL-MCNC: 10.3 MG/DL (ref 8.5–10.5)
CHLORIDE BLD-SCNC: 101 MMOL/L (ref 98–107)
CHOLESTEROL, TOTAL: 273 MG/DL (ref 0–200)
CO2: 33 MMOL/L (ref 20–30)
CREAT SERPL-MCNC: 0.9 MG/DL (ref 0.4–1.2)
EOSINOPHILS ABSOLUTE: 0.1 K/UL (ref 0–0.4)
EOSINOPHILS RELATIVE PERCENT: 1.9 %
GFR AFRICAN AMERICAN: >59
GFR NON-AFRICAN AMERICAN: 60
GLOBULIN: 2.7 G/DL
GLUCOSE BLD-MCNC: 120 MG/DL (ref 74–106)
HBA1C MFR BLD: 5.7 %
HCT VFR BLD CALC: 40.7 % (ref 37–47)
HDLC SERPL-MCNC: 63 MG/DL (ref 40–60)
HEMOGLOBIN: 13.3 G/DL (ref 11.5–16.5)
IMMATURE GRANULOCYTES #: 0 K/UL
IMMATURE GRANULOCYTES %: 0.6 % (ref 0–5)
LDL CHOLESTEROL CALCULATED: 169 MG/DL
LYMPHOCYTES ABSOLUTE: 1.7 K/UL (ref 1.5–4)
LYMPHOCYTES RELATIVE PERCENT: 26.4 %
MCH RBC QN AUTO: 31.4 PG (ref 27–32)
MCHC RBC AUTO-ENTMCNC: 32.7 G/DL (ref 31–35)
MCV RBC AUTO: 96.2 FL (ref 80–100)
MONOCYTES ABSOLUTE: 0.4 K/UL (ref 0.2–0.8)
MONOCYTES RELATIVE PERCENT: 6.1 %
NEUTROPHILS ABSOLUTE: 4.1 K/UL (ref 2–7.5)
NEUTROPHILS RELATIVE PERCENT: 64.7 %
PDW BLD-RTO: 12.9 % (ref 11–16)
PLATELET # BLD: 159 K/UL (ref 150–400)
PMV BLD AUTO: 9.2 FL (ref 6–10)
POTASSIUM SERPL-SCNC: 4.4 MMOL/L (ref 3.4–5.1)
RBC # BLD: 4.23 M/UL (ref 3.8–5.8)
SODIUM BLD-SCNC: 142 MMOL/L (ref 136–145)
TOTAL PROTEIN: 7.3 G/DL (ref 6.4–8.3)
TRIGL SERPL-MCNC: 204 MG/DL (ref 0–249)
VLDLC SERPL CALC-MCNC: 41 MG/DL
WBC # BLD: 6.3 K/UL (ref 4–11)

## 2020-08-28 PROCEDURE — 80061 LIPID PANEL: CPT

## 2020-08-28 PROCEDURE — 36415 COLL VENOUS BLD VENIPUNCTURE: CPT

## 2020-08-28 PROCEDURE — 83036 HEMOGLOBIN GLYCOSYLATED A1C: CPT

## 2020-08-28 PROCEDURE — 85025 COMPLETE CBC W/AUTO DIFF WBC: CPT

## 2020-08-28 PROCEDURE — 77080 DXA BONE DENSITY AXIAL: CPT

## 2020-08-28 PROCEDURE — 80053 COMPREHEN METABOLIC PANEL: CPT

## 2020-09-02 ENCOUNTER — TELEPHONE (OUTPATIENT)
Dept: PRIMARY CARE CLINIC | Age: 80
End: 2020-09-02

## 2020-09-16 ENCOUNTER — TELEPHONE (OUTPATIENT)
Dept: PRIMARY CARE CLINIC | Age: 80
End: 2020-09-16

## 2020-09-16 NOTE — TELEPHONE ENCOUNTER
Gayla Singh from [employer] called patient will need a sleep study to qualify for a cpap and supplies.

## 2020-09-18 NOTE — TELEPHONE ENCOUNTER
Can you check with any of the sleep clinic to see if we can do that at home since she already had the CPAP and let me know.   Thanks

## 2020-10-13 RX ORDER — ALPRAZOLAM 0.5 MG/1
0.5 TABLET ORAL NIGHTLY
Qty: 30 TABLET | Refills: 0 | Status: SHIPPED | OUTPATIENT
Start: 2020-10-13 | End: 2020-10-22 | Stop reason: SDUPTHER

## 2020-10-22 ENCOUNTER — OFFICE VISIT (OUTPATIENT)
Dept: PRIMARY CARE CLINIC | Age: 80
End: 2020-10-22
Payer: MEDICARE

## 2020-10-22 VITALS
DIASTOLIC BLOOD PRESSURE: 70 MMHG | RESPIRATION RATE: 18 BRPM | SYSTOLIC BLOOD PRESSURE: 136 MMHG | TEMPERATURE: 97.7 F | WEIGHT: 216.2 LBS | BODY MASS INDEX: 32.87 KG/M2 | HEART RATE: 76 BPM | OXYGEN SATURATION: 94 %

## 2020-10-22 PROBLEM — I49.5 SSS (SICK SINUS SYNDROME) (HCC): Status: ACTIVE | Noted: 2020-10-22

## 2020-10-22 PROCEDURE — 1090F PRES/ABSN URINE INCON ASSESS: CPT | Performed by: INTERNAL MEDICINE

## 2020-10-22 PROCEDURE — 1123F ACP DISCUSS/DSCN MKR DOCD: CPT | Performed by: INTERNAL MEDICINE

## 2020-10-22 PROCEDURE — G8427 DOCREV CUR MEDS BY ELIG CLIN: HCPCS | Performed by: INTERNAL MEDICINE

## 2020-10-22 PROCEDURE — 4040F PNEUMOC VAC/ADMIN/RCVD: CPT | Performed by: INTERNAL MEDICINE

## 2020-10-22 PROCEDURE — G8400 PT W/DXA NO RESULTS DOC: HCPCS | Performed by: INTERNAL MEDICINE

## 2020-10-22 PROCEDURE — G8484 FLU IMMUNIZE NO ADMIN: HCPCS | Performed by: INTERNAL MEDICINE

## 2020-10-22 PROCEDURE — 99214 OFFICE O/P EST MOD 30 MIN: CPT | Performed by: INTERNAL MEDICINE

## 2020-10-22 PROCEDURE — G8417 CALC BMI ABV UP PARAM F/U: HCPCS | Performed by: INTERNAL MEDICINE

## 2020-10-22 PROCEDURE — 1036F TOBACCO NON-USER: CPT | Performed by: INTERNAL MEDICINE

## 2020-10-22 RX ORDER — DOCUSATE SODIUM 100 MG/1
100 CAPSULE, LIQUID FILLED ORAL DAILY
Qty: 30 CAPSULE | Refills: 5 | Status: SHIPPED | OUTPATIENT
Start: 2020-10-22 | End: 2020-11-21

## 2020-10-22 RX ORDER — ATORVASTATIN CALCIUM 40 MG/1
40 TABLET, FILM COATED ORAL DAILY
Qty: 30 TABLET | Refills: 3 | Status: SHIPPED | OUTPATIENT
Start: 2020-10-22 | End: 2021-03-08

## 2020-10-22 RX ORDER — PROPRANOLOL HCL 60 MG
60 CAPSULE, EXTENDED RELEASE 24HR ORAL DAILY
Qty: 30 CAPSULE | Refills: 5 | Status: SHIPPED | OUTPATIENT
Start: 2020-10-22

## 2020-10-22 RX ORDER — DULOXETIN HYDROCHLORIDE 60 MG/1
60 CAPSULE, DELAYED RELEASE ORAL DAILY
Qty: 30 CAPSULE | Refills: 5 | Status: SHIPPED | OUTPATIENT
Start: 2020-10-22

## 2020-10-22 RX ORDER — RIVASTIGMINE 4.6 MG/24H
1 PATCH, EXTENDED RELEASE TRANSDERMAL DAILY
Qty: 30 PATCH | Refills: 3 | Status: SHIPPED | OUTPATIENT
Start: 2020-10-22 | End: 2021-03-08

## 2020-10-22 RX ORDER — FUROSEMIDE 40 MG/1
40 TABLET ORAL DAILY
Qty: 60 TABLET | Refills: 5 | Status: SHIPPED | OUTPATIENT
Start: 2020-10-22

## 2020-10-22 RX ORDER — AMLODIPINE BESYLATE 5 MG/1
5 TABLET ORAL ONCE
Qty: 30 TABLET | Refills: 5 | Status: SHIPPED | OUTPATIENT
Start: 2020-10-22 | End: 2021-03-08

## 2020-10-22 RX ORDER — ACETAMINOPHEN 160 MG
1 TABLET,DISINTEGRATING ORAL DAILY
Qty: 30 CAPSULE | Refills: 5 | Status: SHIPPED | OUTPATIENT
Start: 2020-10-22 | End: 2021-03-08

## 2020-10-22 RX ORDER — MEMANTINE HYDROCHLORIDE 5 MG/1
5 TABLET ORAL DAILY
Qty: 60 TABLET | Refills: 5 | Status: SHIPPED | OUTPATIENT
Start: 2020-10-22

## 2020-10-22 RX ORDER — GABAPENTIN 300 MG/1
300 CAPSULE ORAL NIGHTLY
Qty: 90 CAPSULE | Refills: 0 | Status: SHIPPED | OUTPATIENT
Start: 2020-10-22 | End: 2021-03-08

## 2020-10-22 RX ORDER — OMEPRAZOLE 20 MG/1
20 CAPSULE, DELAYED RELEASE ORAL DAILY
Qty: 30 CAPSULE | Refills: 5 | Status: SHIPPED | OUTPATIENT
Start: 2020-10-22

## 2020-10-22 RX ORDER — ALPRAZOLAM 0.5 MG/1
0.5 TABLET ORAL NIGHTLY
Qty: 30 TABLET | Refills: 0 | Status: SHIPPED | OUTPATIENT
Start: 2020-10-22 | End: 2020-11-21

## 2020-10-22 RX ORDER — CITALOPRAM 20 MG/1
20 TABLET ORAL DAILY
Qty: 30 TABLET | Refills: 5 | Status: SHIPPED | OUTPATIENT
Start: 2020-10-22

## 2020-10-22 RX ORDER — ARIPIPRAZOLE 5 MG/1
5 TABLET ORAL DAILY
Qty: 30 TABLET | Refills: 5 | Status: SHIPPED | OUTPATIENT
Start: 2020-10-22

## 2020-10-22 ASSESSMENT — ENCOUNTER SYMPTOMS
WHEEZING: 0
EYE DISCHARGE: 0
SORE THROAT: 0
COUGH: 0
SINUS PRESSURE: 0
SHORTNESS OF BREATH: 0
ABDOMINAL PAIN: 0
BACK PAIN: 0
NAUSEA: 0
VOMITING: 0

## 2020-10-22 NOTE — PROGRESS NOTES
Chief Complaint   Patient presents with    Hypertension    Memory Loss       Have you seen any other physician or provider since your last visit no    Have you had any other diagnostic tests since your last visit? no    Have you changed or stopped any medications since your last visit? no

## 2020-10-22 NOTE — PROGRESS NOTES
SUBJECTIVE:    Patient ID: Gurdeep Bradley is a 78 y. o.female. Chief Complaint   Patient presents with    Hypertension    Memory Loss       HPI:  Patient has had hypertension for several years. She has been compliant with taking medications, without side effects from it. She has been following a low-sodium, is lightly active and never exercises. Weight is up 7 pounds, compared to last visit. Her blood pressure is stable at this time. She has had a nerve problem for long time. Her sx have been stable. She occasionally has some difficulties falling and maintaining sleep. She denies any suicidal ideation. She has been compliant with taking medication without side effects. She has supportive family. Patient has been experiencing trouble with memory, mainly short term. Occasional trouble with long term. Sx for the past . Sx are getting worse. She lives with her daughter and son-in-law. She has been taken good care of. Good appetite. Weight has been stable. Patient's medications, allergies, past medical, surgical, social and family histories were reviewed and updated as appropriate in the electronic medical record/chart. Outpatient Medications Marked as Taking for the 10/22/20 encounter (Office Visit) with Marcela Perry MD   Medication Sig Dispense Refill    ALPRAZolam (XANAX) 0.5 MG tablet Take 1 tablet by mouth nightly for 30 days.  30 tablet 0    amLODIPine (NORVASC) 5 MG tablet Take 1 tablet by mouth once for 1 dose 30 tablet 5    memantine (NAMENDA) 5 MG tablet Take 1 tablet by mouth daily 60 tablet 5    ARIPiprazole (ABILIFY) 5 MG tablet Take 1 tablet by mouth daily 30 tablet 5    DULoxetine (CYMBALTA) 60 MG extended release capsule Take 1 capsule by mouth daily 30 capsule 5    omeprazole (PRILOSEC) 20 MG delayed release capsule Take 1 capsule by mouth Daily 30 capsule 5    furosemide (LASIX) 40 MG tablet Take 1 tablet by mouth daily 60 tablet 5    citalopram (CELEXA) 20 MG tablet Take 1 tablet by mouth daily 30 tablet 5    Cholecalciferol (VITAMIN D3) 50 MCG (2000 UT) CAPS Take 1 capsule by mouth daily 30 capsule 5    propranolol (INDERAL LA) 60 MG extended release capsule Take 1 capsule by mouth daily 30 capsule 5    gabapentin (NEURONTIN) 300 MG capsule Take 1 capsule by mouth nightly for 30 days. 90 capsule 0    docusate sodium (COLACE) 100 MG capsule Take 1 capsule by mouth daily for 30 doses 30 capsule 5    rivastigmine (EXELON) 4.6 MG/24HR Place 1 patch onto the skin daily 30 patch 3    atorvastatin (LIPITOR) 40 MG tablet Take 1 tablet by mouth daily 30 tablet 3    acetaminophen (TYLENOL) 325 MG tablet Take one tablet by mouth every 6 hours for five days. Take in alternating fashion with Ibuprofen.  NONFORMULARY CBD gummy bear- Chew one gummy bear (30mg) by moth nightly      aspirin 81 MG chewable tablet Take 81 mg by mouth daily      Multiple Vitamins-Minerals (THERAPEUTIC MULTIVITAMIN-MINERALS) tablet Take 2 tablets by mouth daily           Review of Systems   Constitutional: Negative for chills and fever. HENT: Negative for congestion, sinus pressure and sore throat. Eyes: Negative for discharge and visual disturbance. Respiratory: Negative for cough, shortness of breath and wheezing. Cardiovascular: Positive for leg swelling (L leg). Negative for chest pain and palpitations. HAYDEN   Gastrointestinal: Negative for abdominal pain, nausea and vomiting. Endocrine: Negative for cold intolerance and heat intolerance. Genitourinary: Negative for dysuria, frequency and urgency. Musculoskeletal: Positive for arthralgias and gait problem. Negative for back pain. Skin: Negative for rash and wound. Neurological: Negative for syncope, numbness and headaches. Impaired memory   Hematological: Negative. Psychiatric/Behavioral: Positive for agitation, confusion and sleep disturbance. Negative for self-injury and suicidal ideas.  The patient is nervous/anxious. Depression       Past Medical History:   Diagnosis Date    Arthritis     Fibromyalgia     Hypertension     Osteoporosis      Past Surgical History:   Procedure Laterality Date    BACK SURGERY      COLONOSCOPY N/A 10/3/2019    COLONOSCOPY POLYPECTOMY SNARE/COLD BIOPSY performed by Dominique Kevin MD at [facility] Left     knee surgery    PACEMAKER PLACEMENT      replaced on 06/24/20    SHOULDER SURGERY Right       History reviewed. No pertinent family history. Social History     Tobacco Use   Smoking Status Never Smoker   Smokeless Tobacco Never Used       OBJECTIVE:   Wt Readings from Last 3 Encounters:   10/22/20 216 lb 3.2 oz (98.1 kg)   08/21/20 209 lb 9.6 oz (95.1 kg)   07/01/20 209 lb 1.6 oz (94.8 kg)     BP Readings from Last 3 Encounters:   10/22/20 136/70   08/21/20 134/72   07/01/20 (!) 158/80       /70   Pulse 76   Temp 97.7 °F (36.5 °C) (Temporal)   Resp 18   Wt 216 lb 3.2 oz (98.1 kg)   SpO2 94%   BMI 32.87 kg/m²      Physical Exam  Vitals signs and nursing note reviewed. Constitutional:       Appearance: Normal appearance. She is well-developed. She is obese. Comments: Using cane   HENT:      Head: Normocephalic and atraumatic. Right Ear: External ear normal.      Left Ear: External ear normal.      Nose: Nose normal.      Mouth/Throat:      Mouth: Mucous membranes are moist.      Pharynx: Oropharynx is clear. Eyes:      Conjunctiva/sclera: Conjunctivae normal.      Pupils: Pupils are equal, round, and reactive to light. Neck:      Musculoskeletal: Neck supple. No neck rigidity or muscular tenderness. Thyroid: No thyromegaly. Vascular: No JVD. Cardiovascular:      Rate and Rhythm: Normal rate and regular rhythm. Heart sounds: Normal heart sounds. Pulmonary:      Effort: Pulmonary effort is normal.      Breath sounds: Normal breath sounds. No wheezing or rales.    Abdominal: General: Bowel sounds are normal. There is no distension. Palpations: Abdomen is soft. Tenderness: There is no abdominal tenderness. Musculoskeletal:         General: No tenderness. Right lower leg: No edema. Left lower leg: Edema (trace) present. Skin:     Findings: No erythema or rash. Neurological:      General: No focal deficit present. Mental Status: She is alert and oriented to person, place, and time. Comments: Decreased memory   Psychiatric:         Behavior: Behavior normal.         Judgment: Judgment normal.         Lab Results   Component Value Date     08/28/2020    K 4.4 08/28/2020    K 4.0 06/30/2020     08/28/2020    CO2 33 08/28/2020    GLUCOSE 120 08/28/2020    BUN 15 08/28/2020    CREATININE 0.9 08/28/2020    CALCIUM 10.3 08/28/2020    PROT 7.3 08/28/2020    LABALBU 4.6 08/28/2020    BILITOT 0.4 08/28/2020    ALT 16 08/28/2020    AST 21 08/28/2020       Hemoglobin A1C (%)   Date Value   08/28/2020 5.7     Microscopic Examination (no units)   Date Value   06/30/2020 Not Indicated     LDL Calculated (mg/dL)   Date Value   08/28/2020 169 (H)         Lab Results   Component Value Date    WBC 6.3 08/28/2020    NEUTROABS 4.1 08/28/2020    HGB 13.3 08/28/2020    HCT 40.7 08/28/2020    MCV 96.2 08/28/2020     08/28/2020       Lab Results   Component Value Date    TSH 2.24 06/30/2020       ASSESSMENT/PLAN:     1. Benign essential HTN  BP is stable. I have advised her on low-sodium diet, exercise and weight control. I am going to continue current medication. Will monitor her renal function every few months, have advised her to check blood pressure frequently and to keep a record of this. 2. Anxiety  I am going to continue current medication. I advised the patient to seek counseling. I will reassess current condition every few months. Patient to call or RTC if experienced any deterioration of Sx.      - ALPRAZolam (XANAX) 0.5 MG tablet;  Take 1 tablet by mouth nightly for 30 days. Dispense: 30 tablet; Refill: 0    3. SSS (sick sinus syndrome) ([address]  Status post pacemaker implantation. Continue follow-up with cardiology. 4. Memory problem  Long discussion with pt and the family regarding the process of the disease. I informed them that meds will slow the progression of the disease. Will check MMSE every few months. I will continue on current medication. Monitor nutrition status and activity level. Monitor basic lab periodically. 5. Hyperlipidemia, unspecified hyperlipidemia type  I have advised her on low-fat diet, exercise and weight control. I am going to continue on current medication. I have also advised her on the possible side effects from the medication. I will monitor her liver functions and lipid profile every few months. Last lipid profile not at goal.  Not sure if not using Lipitor on a regular basis. Lab Results   Component Value Date    LDLCALC 169 (H) 08/28/2020    LDLCHOLESTEROL 92 11/12/2015     - Comprehensive Metabolic Panel; Future  - Lipid Panel; Future    6. Other chronic pain  Continue on current regimen. Gabapentin Rx refilled. 1. Goal is to alleviate pain to a degree that the patient can participate in own ADLS social activity and improve function. 2. Risk and benefits were reviewed at length, including risk for addiction and possible side effects and interactions. Alternative therapy was discussed and will be a part of the patients overall care. Including but not limited to, physical therapy, other medications as well as behavioral and activity modification and the use of other modalities (chiropractic care ect. ). 3. This patient does not exhibit a potential for abuse or addiction at this time. Will monitor closely. 4. UDS has been compliant. Will randomly check drug screen. 5. Roxanne Goldberg completed and compliant, will check every 3 months.    6. Advised her that UDS and possible pill counts and frequent monitoring will be a part of her care. 7. Patient has medication agreement and consent to treat with this office. Patient has been informed not to seek or obtain medication from other practitioners and to notify our office ASAP if this happened on emergent basis. - gabapentin (NEURONTIN) 300 MG capsule; Take 1 capsule by mouth nightly for 30 days. Dispense: 90 capsule; Refill: 0      Orders Placed This Encounter   Medications    ALPRAZolam (XANAX) 0.5 MG tablet     Sig: Take 1 tablet by mouth nightly for 30 days. Dispense:  30 tablet     Refill:  0    amLODIPine (NORVASC) 5 MG tablet     Sig: Take 1 tablet by mouth once for 1 dose     Dispense:  30 tablet     Refill:  5    memantine (NAMENDA) 5 MG tablet     Sig: Take 1 tablet by mouth daily     Dispense:  60 tablet     Refill:  5    ARIPiprazole (ABILIFY) 5 MG tablet     Sig: Take 1 tablet by mouth daily     Dispense:  30 tablet     Refill:  5    DULoxetine (CYMBALTA) 60 MG extended release capsule     Sig: Take 1 capsule by mouth daily     Dispense:  30 capsule     Refill:  5    omeprazole (PRILOSEC) 20 MG delayed release capsule     Sig: Take 1 capsule by mouth Daily     Dispense:  30 capsule     Refill:  5    furosemide (LASIX) 40 MG tablet     Sig: Take 1 tablet by mouth daily     Dispense:  60 tablet     Refill:  5    citalopram (CELEXA) 20 MG tablet     Sig: Take 1 tablet by mouth daily     Dispense:  30 tablet     Refill:  5    Cholecalciferol (VITAMIN D3) 50 MCG (2000 UT) CAPS     Sig: Take 1 capsule by mouth daily     Dispense:  30 capsule     Refill:  5    propranolol (INDERAL LA) 60 MG extended release capsule     Sig: Take 1 capsule by mouth daily     Dispense:  30 capsule     Refill:  5    gabapentin (NEURONTIN) 300 MG capsule     Sig: Take 1 capsule by mouth nightly for 30 days.      Dispense:  90 capsule     Refill:  0    docusate sodium (COLACE) 100 MG capsule     Sig: Take 1 capsule by mouth daily for 30 doses     Dispense:  30 capsule     Refill: 5    rivastigmine (EXELON) 4.6 MG/24HR     Sig: Place 1 patch onto the skin daily     Dispense:  30 patch     Refill:  3    atorvastatin (LIPITOR) 40 MG tablet     Sig: Take 1 tablet by mouth daily     Dispense:  30 tablet     Refill:  3      Written by Julienne Harrington, acting as a scribe for Dr. Ale Monreal on 10/22/2020 at 2:54 PM.     I, Dr. Matt Cam, personally performed the services described in the documentation as scribed by Gordo Kent CMA, in my presence and it is both accurate and complete.

## 2020-12-11 ENCOUNTER — HOSPITAL ENCOUNTER (OUTPATIENT)
Facility: HOSPITAL | Age: 80
Discharge: HOME | End: 2020-12-11
Payer: MEDICARE

## 2020-12-11 LAB
A/G RATIO: 1.6 (ref 0.8–2)
ALBUMIN SERPL-MCNC: 4 G/DL (ref 3.4–4.8)
ALP BLD-CCNC: 133 U/L (ref 25–100)
ALT SERPL-CCNC: 18 U/L (ref 4–36)
ANION GAP SERPL CALCULATED.3IONS-SCNC: 11 MMOL/L (ref 3–16)
AST SERPL-CCNC: 18 U/L (ref 8–33)
BASOPHILS ABSOLUTE: 0 K/UL (ref 0–0.1)
BASOPHILS RELATIVE PERCENT: 0.3 %
BILIRUB SERPL-MCNC: 0.3 MG/DL (ref 0.3–1.2)
BUN BLDV-MCNC: 19 MG/DL (ref 6–20)
CALCIUM SERPL-MCNC: 9.6 MG/DL (ref 8.5–10.5)
CHLORIDE BLD-SCNC: 102 MMOL/L (ref 98–107)
CHOLESTEROL, TOTAL: 218 MG/DL (ref 0–200)
CO2: 29 MMOL/L (ref 20–30)
CREAT SERPL-MCNC: 1 MG/DL (ref 0.4–1.2)
EOSINOPHILS ABSOLUTE: 0.1 K/UL (ref 0–0.4)
EOSINOPHILS RELATIVE PERCENT: 1.6 %
FOLATE: >20 NG/ML
GFR AFRICAN AMERICAN: >59
GFR NON-AFRICAN AMERICAN: 53
GLOBULIN: 2.5 G/DL
GLUCOSE BLD-MCNC: 114 MG/DL (ref 74–106)
HBA1C MFR BLD: 6 %
HCT VFR BLD CALC: 37.9 % (ref 37–47)
HDLC SERPL-MCNC: 55 MG/DL (ref 40–60)
HEMOGLOBIN: 12.2 G/DL (ref 11.5–16.5)
IMMATURE GRANULOCYTES #: 0 K/UL
IMMATURE GRANULOCYTES %: 0.3 % (ref 0–5)
LDL CHOLESTEROL CALCULATED: 132 MG/DL
LYMPHOCYTES ABSOLUTE: 2 K/UL (ref 1.5–4)
LYMPHOCYTES RELATIVE PERCENT: 27.3 %
MCH RBC QN AUTO: 30.6 PG (ref 27–32)
MCHC RBC AUTO-ENTMCNC: 32.2 G/DL (ref 31–35)
MCV RBC AUTO: 95 FL (ref 80–100)
MONOCYTES ABSOLUTE: 0.5 K/UL (ref 0.2–0.8)
MONOCYTES RELATIVE PERCENT: 6 %
NEUTROPHILS ABSOLUTE: 4.8 K/UL (ref 2–7.5)
NEUTROPHILS RELATIVE PERCENT: 64.5 %
PDW BLD-RTO: 12.9 % (ref 11–16)
PLATELET # BLD: 173 K/UL (ref 150–400)
PMV BLD AUTO: 10.7 FL (ref 6–10)
POTASSIUM SERPL-SCNC: 4.1 MMOL/L (ref 3.4–5.1)
RBC # BLD: 3.99 M/UL (ref 3.8–5.8)
SODIUM BLD-SCNC: 142 MMOL/L (ref 136–145)
TOTAL PROTEIN: 6.5 G/DL (ref 6.4–8.3)
TRIGL SERPL-MCNC: 154 MG/DL (ref 0–249)
TSH SERPL DL<=0.05 MIU/L-ACNC: 2.75 UIU/ML (ref 0.27–4.2)
VITAMIN B-12: 740 PG/ML (ref 211–911)
VLDLC SERPL CALC-MCNC: 31 MG/DL
WBC # BLD: 7.5 K/UL (ref 4–11)

## 2020-12-11 PROCEDURE — 82746 ASSAY OF FOLIC ACID SERUM: CPT

## 2020-12-11 PROCEDURE — 82607 VITAMIN B-12: CPT

## 2020-12-11 PROCEDURE — 80053 COMPREHEN METABOLIC PANEL: CPT

## 2020-12-11 PROCEDURE — 80061 LIPID PANEL: CPT

## 2020-12-11 PROCEDURE — 83036 HEMOGLOBIN GLYCOSYLATED A1C: CPT

## 2020-12-11 PROCEDURE — 84443 ASSAY THYROID STIM HORMONE: CPT

## 2020-12-11 PROCEDURE — 85025 COMPLETE CBC W/AUTO DIFF WBC: CPT

## 2021-02-03 DIAGNOSIS — G89.29 OTHER CHRONIC PAIN: ICD-10-CM

## 2021-02-04 RX ORDER — GABAPENTIN 300 MG/1
CAPSULE ORAL
Qty: 90 CAPSULE | Refills: 0 | OUTPATIENT
Start: 2021-02-04

## 2021-02-23 ENCOUNTER — HOSPITAL ENCOUNTER (OUTPATIENT)
Facility: HOSPITAL | Age: 81
Discharge: HOME | End: 2021-02-23
Payer: MEDICARE

## 2021-02-23 LAB
A/G RATIO: 1.7 (ref 0.8–2)
ALBUMIN SERPL-MCNC: 4.2 G/DL (ref 3.4–4.8)
ALP BLD-CCNC: 136 U/L (ref 25–100)
ALT SERPL-CCNC: 16 U/L (ref 4–36)
ANION GAP SERPL CALCULATED.3IONS-SCNC: 12 MMOL/L (ref 3–16)
AST SERPL-CCNC: 20 U/L (ref 8–33)
BASOPHILS ABSOLUTE: 0 K/UL (ref 0–0.1)
BASOPHILS RELATIVE PERCENT: 0.4 %
BILIRUB SERPL-MCNC: 0.3 MG/DL (ref 0.3–1.2)
BUN BLDV-MCNC: 12 MG/DL (ref 6–20)
CALCIUM SERPL-MCNC: 9.9 MG/DL (ref 8.5–10.5)
CHLORIDE BLD-SCNC: 105 MMOL/L (ref 98–107)
CHOLESTEROL, TOTAL: 225 MG/DL (ref 0–200)
CO2: 27 MMOL/L (ref 20–30)
CREAT SERPL-MCNC: 0.9 MG/DL (ref 0.4–1.2)
EOSINOPHILS ABSOLUTE: 0.1 K/UL (ref 0–0.4)
EOSINOPHILS RELATIVE PERCENT: 2.2 %
FOLATE: >20 NG/ML
GFR AFRICAN AMERICAN: >59
GFR NON-AFRICAN AMERICAN: 60
GLOBULIN: 2.5 G/DL
GLUCOSE BLD-MCNC: 160 MG/DL (ref 74–106)
HBA1C MFR BLD: 6 %
HCT VFR BLD CALC: 42.8 % (ref 37–47)
HDLC SERPL-MCNC: 52 MG/DL (ref 40–60)
HEMOGLOBIN: 13.9 G/DL (ref 11.5–16.5)
IMMATURE GRANULOCYTES #: 0 K/UL
IMMATURE GRANULOCYTES %: 0.6 % (ref 0–5)
LDL CHOLESTEROL CALCULATED: 128 MG/DL
LYMPHOCYTES ABSOLUTE: 1.5 K/UL (ref 1.5–4)
LYMPHOCYTES RELATIVE PERCENT: 30 %
MCH RBC QN AUTO: 30.1 PG (ref 27–32)
MCHC RBC AUTO-ENTMCNC: 32.5 G/DL (ref 31–35)
MCV RBC AUTO: 92.6 FL (ref 80–100)
MONOCYTES ABSOLUTE: 0.3 K/UL (ref 0.2–0.8)
MONOCYTES RELATIVE PERCENT: 5.6 %
NEUTROPHILS ABSOLUTE: 3 K/UL (ref 2–7.5)
NEUTROPHILS RELATIVE PERCENT: 61.2 %
PDW BLD-RTO: 13.5 % (ref 11–16)
PLATELET # BLD: 145 K/UL (ref 150–400)
PMV BLD AUTO: 9.9 FL (ref 6–10)
POTASSIUM SERPL-SCNC: 4 MMOL/L (ref 3.4–5.1)
RBC # BLD: 4.62 M/UL (ref 3.8–5.8)
SODIUM BLD-SCNC: 144 MMOL/L (ref 136–145)
TOTAL PROTEIN: 6.7 G/DL (ref 6.4–8.3)
TRIGL SERPL-MCNC: 223 MG/DL (ref 0–249)
TSH SERPL DL<=0.05 MIU/L-ACNC: 3.81 UIU/ML (ref 0.27–4.2)
VITAMIN B-12: 1115 PG/ML (ref 211–911)
VLDLC SERPL CALC-MCNC: 45 MG/DL
WBC # BLD: 5 K/UL (ref 4–11)

## 2021-02-23 PROCEDURE — 80061 LIPID PANEL: CPT

## 2021-02-23 PROCEDURE — 82607 VITAMIN B-12: CPT

## 2021-02-23 PROCEDURE — 84443 ASSAY THYROID STIM HORMONE: CPT

## 2021-02-23 PROCEDURE — 82746 ASSAY OF FOLIC ACID SERUM: CPT

## 2021-02-23 PROCEDURE — 85025 COMPLETE CBC W/AUTO DIFF WBC: CPT

## 2021-02-23 PROCEDURE — 83036 HEMOGLOBIN GLYCOSYLATED A1C: CPT

## 2021-02-23 PROCEDURE — 80053 COMPREHEN METABOLIC PANEL: CPT

## 2021-03-08 ENCOUNTER — APPOINTMENT (OUTPATIENT)
Dept: CT IMAGING | Facility: HOSPITAL | Age: 81
End: 2021-03-08
Payer: MEDICARE

## 2021-03-08 ENCOUNTER — HOSPITAL ENCOUNTER (EMERGENCY)
Facility: HOSPITAL | Age: 81
Discharge: HOME | End: 2021-03-09
Attending: EMERGENCY MEDICINE
Payer: MEDICARE

## 2021-03-08 ENCOUNTER — APPOINTMENT (OUTPATIENT)
Dept: GENERAL RADIOLOGY | Facility: HOSPITAL | Age: 81
End: 2021-03-08
Payer: MEDICARE

## 2021-03-08 DIAGNOSIS — R46.89 AGGRESSIVE BEHAVIOR: ICD-10-CM

## 2021-03-08 DIAGNOSIS — F03.91 DEMENTIA WITH BEHAVIORAL DISTURBANCE, UNSPECIFIED DEMENTIA TYPE: Primary | ICD-10-CM

## 2021-03-08 DIAGNOSIS — Z79.899 POLYPHARMACY: ICD-10-CM

## 2021-03-08 LAB
A/G RATIO: 1.2 (ref 0.8–2)
ALBUMIN SERPL-MCNC: 4.1 G/DL (ref 3.4–4.8)
ALP BLD-CCNC: 130 U/L (ref 25–100)
ALT SERPL-CCNC: 15 U/L (ref 4–36)
ANION GAP SERPL CALCULATED.3IONS-SCNC: 7 MMOL/L (ref 3–16)
AST SERPL-CCNC: 19 U/L (ref 8–33)
BASOPHILS ABSOLUTE: 0 K/UL (ref 0–0.1)
BASOPHILS RELATIVE PERCENT: 0.4 %
BILIRUB SERPL-MCNC: 0.4 MG/DL (ref 0.3–1.2)
BUN BLDV-MCNC: 19 MG/DL (ref 6–20)
CALCIUM SERPL-MCNC: 10.3 MG/DL (ref 8.5–10.5)
CHLORIDE BLD-SCNC: 102 MMOL/L (ref 98–107)
CO2: 30 MMOL/L (ref 20–30)
CREAT SERPL-MCNC: 1 MG/DL (ref 0.4–1.2)
EOSINOPHILS ABSOLUTE: 0.1 K/UL (ref 0–0.4)
EOSINOPHILS RELATIVE PERCENT: 1.5 %
GFR AFRICAN AMERICAN: >59
GFR NON-AFRICAN AMERICAN: 53
GLOBULIN: 3.3 G/DL
GLUCOSE BLD-MCNC: 130 MG/DL (ref 74–106)
HCT VFR BLD CALC: 42.7 % (ref 37–47)
HEMOGLOBIN: 14 G/DL (ref 11.5–16.5)
IMMATURE GRANULOCYTES #: 0 K/UL
IMMATURE GRANULOCYTES %: 0.3 % (ref 0–5)
LACTIC ACID: 1.8 MMOL/L (ref 0.4–2)
LYMPHOCYTES ABSOLUTE: 2 K/UL (ref 1.5–4)
LYMPHOCYTES RELATIVE PERCENT: 25.8 %
MCH RBC QN AUTO: 29.8 PG (ref 27–32)
MCHC RBC AUTO-ENTMCNC: 32.8 G/DL (ref 31–35)
MCV RBC AUTO: 90.9 FL (ref 80–100)
MONOCYTES ABSOLUTE: 0.4 K/UL (ref 0.2–0.8)
MONOCYTES RELATIVE PERCENT: 5.6 %
NEUTROPHILS ABSOLUTE: 5.2 K/UL (ref 2–7.5)
NEUTROPHILS RELATIVE PERCENT: 66.4 %
PDW BLD-RTO: 13.2 % (ref 11–16)
PLATELET # BLD: 167 K/UL (ref 150–400)
PMV BLD AUTO: 9.4 FL (ref 6–10)
POTASSIUM SERPL-SCNC: 3.7 MMOL/L (ref 3.4–5.1)
PRO-BNP: 87 PG/ML (ref 0–1800)
RBC # BLD: 4.7 M/UL (ref 3.8–5.8)
SARS-COV-2, NAAT: NOT DETECTED
SODIUM BLD-SCNC: 139 MMOL/L (ref 136–145)
TOTAL PROTEIN: 7.4 G/DL (ref 6.4–8.3)
TROPONIN: <0.3 NG/ML
TSH REFLEX: 2.9 UIU/ML (ref 0.27–4.2)
WBC # BLD: 7.8 K/UL (ref 4–11)

## 2021-03-08 PROCEDURE — 2580000003 HC RX 258: Performed by: EMERGENCY MEDICINE

## 2021-03-08 PROCEDURE — 99283 EMERGENCY DEPT VISIT LOW MDM: CPT

## 2021-03-08 PROCEDURE — 83880 ASSAY OF NATRIURETIC PEPTIDE: CPT

## 2021-03-08 PROCEDURE — 71045 X-RAY EXAM CHEST 1 VIEW: CPT

## 2021-03-08 PROCEDURE — 87635 SARS-COV-2 COVID-19 AMP PRB: CPT

## 2021-03-08 PROCEDURE — 36415 COLL VENOUS BLD VENIPUNCTURE: CPT

## 2021-03-08 PROCEDURE — 84443 ASSAY THYROID STIM HORMONE: CPT

## 2021-03-08 PROCEDURE — 85025 COMPLETE CBC W/AUTO DIFF WBC: CPT

## 2021-03-08 PROCEDURE — 87040 BLOOD CULTURE FOR BACTERIA: CPT

## 2021-03-08 PROCEDURE — 84484 ASSAY OF TROPONIN QUANT: CPT

## 2021-03-08 PROCEDURE — 83605 ASSAY OF LACTIC ACID: CPT

## 2021-03-08 PROCEDURE — 70450 CT HEAD/BRAIN W/O DYE: CPT

## 2021-03-08 PROCEDURE — 93005 ELECTROCARDIOGRAM TRACING: CPT

## 2021-03-08 PROCEDURE — 80053 COMPREHEN METABOLIC PANEL: CPT

## 2021-03-08 RX ORDER — ACETAMINOPHEN 500 MG
500 TABLET ORAL DAILY
COMMUNITY

## 2021-03-08 RX ORDER — TRAZODONE HYDROCHLORIDE 100 MG/1
100 TABLET ORAL NIGHTLY
COMMUNITY

## 2021-03-08 RX ORDER — 0.9 % SODIUM CHLORIDE 0.9 %
1000 INTRAVENOUS SOLUTION INTRAVENOUS ONCE
Status: COMPLETED | OUTPATIENT
Start: 2021-03-08 | End: 2021-03-08

## 2021-03-08 RX ORDER — ACETAMINOPHEN/DIPHENHYDRAMINE 500MG-25MG
1 TABLET ORAL NIGHTLY
COMMUNITY

## 2021-03-08 RX ORDER — DOCUSATE SODIUM 100 MG/1
100 CAPSULE, LIQUID FILLED ORAL DAILY
COMMUNITY

## 2021-03-08 RX ORDER — RIVASTIGMINE TARTRATE 3 MG/1
3 CAPSULE ORAL DAILY
COMMUNITY

## 2021-03-08 RX ORDER — POLYETHYLENE GLYCOL 3350 17 G/17G
17 POWDER, FOR SOLUTION ORAL DAILY
COMMUNITY

## 2021-03-08 RX ORDER — QUETIAPINE FUMARATE 50 MG/1
50 TABLET, EXTENDED RELEASE ORAL NIGHTLY
COMMUNITY

## 2021-03-08 RX ORDER — ALPRAZOLAM 0.5 MG/1
0.5 TABLET ORAL NIGHTLY
COMMUNITY

## 2021-03-08 RX ORDER — QUETIAPINE FUMARATE 25 MG/1
25 TABLET, FILM COATED ORAL DAILY
COMMUNITY

## 2021-03-08 RX ADMIN — SODIUM CHLORIDE 1000 ML: 9 INJECTION, SOLUTION INTRAVENOUS at 20:12

## 2021-03-09 VITALS
RESPIRATION RATE: 16 BRPM | DIASTOLIC BLOOD PRESSURE: 82 MMHG | SYSTOLIC BLOOD PRESSURE: 164 MMHG | TEMPERATURE: 97.5 F | HEART RATE: 75 BPM | OXYGEN SATURATION: 97 %

## 2021-03-09 NOTE — ED NOTES
Patients daughter Jung Espitia [facility] [phone], she wants us to do what ever we need to, to get patient psych evaluation for patient.      Socorro Mcgee RN  03/08/21 [id number]

## 2021-03-09 NOTE — ED NOTES
[facility] and rehab called at this time, talked with patients nurse and she will fax their nurses notes and copy of POA to [facility] in a few minutes.      Yamila Rausch RN  03/08/21 [id number]

## 2021-03-09 NOTE — ED PROVIDER NOTES
[address]  eMERGENCY dEPARTMENT eNCOUnter      Pt Name: Mey Armijo  MRN: [id number]  YOB: 1940  Date of evaluation: 9/7/4887  Provider: Vero Thompson MD    CHIEF COMPLAINT       Chief Complaint   Patient presents with    Aggressive Behavior         HISTORY OF PRESENT ILLNESS  (Location/Symptom, Timing/Onset, Context/Setting, Quality, Duration,Modifying Factors, Severity.)   Mey Armijo is a 78 y.o. female who presents to the emergency department from a NH for AMS and combative behavior. Patient says that she remembers throwing things but doesn't know why. She denies feeling sick or being in any pain. Patient was just admitted to the NH a week ago. She says she doesn't like it there because they restrict her activity. She was living with her daughter prior who could no longer care for her. She says this makes her sad and \"probably\" depressed. She denies being suicidal.      Nursing notes were reviewed. REVIEW OF SYSTEMS    (2-9 systems for level 4, 10 or more for level 5)   ROS:  General:  No fevers, no chills, no weakness  Cardiovascular:  No chest pain, no palpitations  Respiratory:  No shortness of breath, no cough, nowheezing  Gastrointestinal:  No pain, no nausea, no vomiting, no diarrhea  Musculoskeletal:  No muscle pain, no joint pain  Skin:  No rash, no easy bruising  Neurologic:  No speech problems, no headache, no extremity numbness, no extremity  tingling, no extremity weakness  Psychiatric:  No anxiety  Genitourinary:  No dysuria, no hematuria    Except as noted above the remainder of the review of systems was reviewed and negative.        PAST MEDICAL HISTORY     Past Medical History:   Diagnosis Date    Arthritis     Fibromyalgia     Hypertension     Osteoporosis          SURGICAL HISTORY       Past Surgical History:   Procedure Laterality Date    BACK SURGERY      COLONOSCOPY N/A 10/3/2019    COLONOSCOPY POLYPECTOMY SNARE/COLD BIOPSY performed by Primo Pro MD at [facility] Left     knee surgery    PACEMAKER PLACEMENT      replaced on 06/24/20    SHOULDER SURGERY Right          CURRENT MEDICATIONS       Previous Medications    ACETAMINOPHEN (TYLENOL) 500 MG TABLET    Take 500 mg by mouth daily    ALPRAZOLAM (XANAX) 0.5 MG TABLET    Take 0.5 mg by mouth nightly. AMLODIPINE (NORVASC) 5 MG TABLET    Take 1 tablet by mouth once for 1 dose    ARIPIPRAZOLE (ABILIFY) 5 MG TABLET    Take 1 tablet by mouth daily    CITALOPRAM (CELEXA) 20 MG TABLET    Take 1 tablet by mouth daily    DIPHENHYDRAMINE-APAP, SLEEP, (TYLENOL PM EXTRA STRENGTH)  MG TABLET    Take 1 tablet by mouth nightly    DOCUSATE SODIUM (COLACE) 100 MG CAPSULE    Take 100 mg by mouth daily    DULOXETINE (CYMBALTA) 60 MG EXTENDED RELEASE CAPSULE    Take 1 capsule by mouth daily    FUROSEMIDE (LASIX) 40 MG TABLET    Take 1 tablet by mouth daily    GABAPENTIN (NEURONTIN) 300 MG CAPSULE    Take 1 capsule by mouth nightly for 30 days. MEMANTINE (NAMENDA) 5 MG TABLET    Take 1 tablet by mouth daily    OMEPRAZOLE (PRILOSEC) 20 MG DELAYED RELEASE CAPSULE    Take 1 capsule by mouth Daily    POLYETHYLENE GLYCOL (GLYCOLAX) 17 G PACKET    Take 17 g by mouth daily    PROPRANOLOL (INDERAL LA) 60 MG EXTENDED RELEASE CAPSULE    Take 1 capsule by mouth daily    QUETIAPINE (SEROQUEL XR) 50 MG EXTENDED RELEASE TABLET    Take 50 mg by mouth nightly    QUETIAPINE (SEROQUEL) 25 MG TABLET    Take 25 mg by mouth daily    RIVASTIGMINE (EXELON) 3 MG CAPSULE    Take 3 mg by mouth daily    TRAZODONE (DESYREL) 100 MG TABLET    Take 100 mg by mouth nightly       ALLERGIES     Pcn [penicillins] and Tetanus toxoids    FAMILY HISTORY     History reviewed. No pertinent family history. SOCIAL HISTORY       Social History     Socioeconomic History    Marital status:       Spouse name: None    Number of children: None    Years of education: None    Highest education level: None   Occupational History    None   Social Needs    Financial resource strain: None    Food insecurity     Worry: None     Inability: None    Transportation needs     Medical: None     Non-medical: None   Tobacco Use    Smoking status: Never Smoker    Smokeless tobacco: Never Used   Substance and Sexual Activity    Alcohol use: No    Drug use: No    Sexual activity: None   Lifestyle    Physical activity     Days per week: None     Minutes per session: None    Stress: None   Relationships    Social connections     Talks on phone: None     Gets together: None     Attends Bahai service: None     Active member of club or organization: None     Attends meetings of clubs or organizations: None     Relationship status: None    Intimate partner violence     Fear of current or ex partner: None     Emotionally abused: None     Physically abused: None     Forced sexual activity: None   Other Topics Concern    None   Social History Narrative    None         PHYSICAL EXAM    (up to 7 for level 4, 8 or more for level 5)     ED Triage Vitals   BP Temp Temp src Pulse Resp SpO2 Height Weight   -- -- -- -- -- -- -- --       Physical Exam  General :Patient is awake, alert, oriented, in no acute distress, nontoxic appearing  HEENT: Pupils are equally round and reactive to light, EOMI. Oral mucosa is moist, no exudate. No pharyngeal erythema. Neck: Neck is supple, full range of motion  Cardiac: Heart regular rate, rhythm, no murmurs, rubs, or gallops  Lungs: Lungs are clear to auscultation, there is no wheezing, rhonchi, or rales. Chest wall: There is no tenderness to palpation over the chest wall or over ribs  Abdomen: Abdomen is soft, nontender, nondistended. There is no firm or pulsatile masses, no rebound, rigidity or guarding. Musculoskeletal: 5 out of 5 strength in all 4 extremities. No focal muscle deficits are appreciated  Back: No midline or bony tenderness.   No CVAT.   Neuro: Motor intact, sensory intact, level of consciousness is normal.  Dermatology: Skin is warm and dry  Psych: Mentation is grossly normal,cognition is grossly normal. Affect is appropriate. Patient thinks it's April but doesn't know the year. She doesn't know the President. She doesn't know what hospital this is. DIAGNOSTIC RESULTS     EKG: All EKG's are interpreted by the[facility] Physician who either signs or Co-signs this chart in the absence of a cardiologist.    NSR  Rate of 83  RBBB  No acute changes    RADIOLOGY:   Non-plain film images such as CT, Ultrasound and MRI are read by the radiologist. Plain radiographic images are visualized and preliminarily interpreted by the emergency physician with the below findings:      []Radiologist's Report Reviewed:  XR CHEST PORTABLE   Final Result      Limited inspiration. No acute cardiopulmonary findings. CT HEAD WO CONTRAST   Final Result      Evidence of diffuse chronic small vessel white matter disease bilaterally. No acute intracranial findings.                ED BEDSIDE ULTRASOUND:   Performed by ED Physician - none    LABS:  Labs Reviewed   COMPREHENSIVE METABOLIC PANEL - Abnormal; Notable for the following components:       Result Value    Glucose 130 (*)     GFR Non-African American 53 (*)     Alkaline Phosphatase 130 (*)     All other components within normal limits    Narrative:     Performed at:  [address] [place] [address] Laboratory  [address] [place] [address],  [place], [place] [address]   Phone [phone], RAPID    Narrative:     Performed at:  [address] [place] [address] Laboratory  [address] [place] [address],  [place], [place] [address]   Phone [phone]   CULTURE, BLOOD 1   BRAIN NATRIURETIC PEPTIDE    Narrative:     Performed at:  [address] [place] [address] and [facility]  [address] [place] [address],  [place], [place] [address]   Phone [phone]   CBC WITH AUTO DIFFERENTIAL Narrative:     Performed at:  [facility]  [address] [place], [place] [address], [place] [address]   Phone [phone]   LACTIC ACID, PLASMA    Narrative:     Performed at:  [address] [place] [address] Laboratory  [address] [place], [place] [address], [place] [address]   Phone [phone]   TROPONIN    Narrative:     Performed at:  [address] [place] [address] Laboratory  [address] [place], [place] [address], [place] [address]   Phone [phone]   TSH WITH REFLEX    Narrative:     Performed at:  [address] [place] [address] Laboratory  [address] [place], [place] [address], [place] [address]   Phone [phone]   URINE RT REFLEX TO CULTURE       I have reviewed and interpreted all ofthe currently available lab results from this visit (if applicable):  Results for orders placed or performed during the hospital encounter of 03/08/21   COVID-19, Rapid    Specimen: Nasopharyngeal Swab   Result Value Ref Range    SARS-CoV-2, NAAT Not Detected Not Detected   Brain Natriuretic Peptide   Result Value Ref Range    Pro-BNP 87 0 - 1,800 pg/mL   CBC Auto Differential   Result Value Ref Range    WBC 7.8 4.0 - 11.0 K/uL    RBC 4.70 3.80 - 5.80 M/uL    Hemoglobin 14.0 11.5 - 16.5 g/dL    Hematocrit 42.7 37.0 - 47.0 %    MCV 90.9 80.0 - 100.0 fL    MCH 29.8 27.0 - 32.0 pg    MCHC 32.8 31.0 - 35.0 g/dL    RDW 13.2 11.0 - 16.0 %    Platelets 364 260 - 910 K/uL    MPV 9.4 6.0 - 10.0 fL    Neutrophils % 66.4 %    Immature Granulocytes % 0.3 0.0 - 5.0 %    Lymphocytes % 25.8 %    Monocytes % 5.6 %    Eosinophils % 1.5 %    Basophils % 0.4 %    Neutrophils Absolute 5.2 2.0 - 7.5 K/uL    Immature Granulocytes # 0.0 K/uL    Lymphocytes Absolute 2.0 1.5 - 4.0 K/uL    Monocytes Absolute 0.4 0.2 - 0.8 K/uL    Eosinophils Absolute 0.1 0.0 - 0.4 K/uL    Basophils Absolute 0.0 0.0 - 0.1 K/uL   Comprehensive Metabolic Panel   Result Value Ref Range    Sodium 139 136 - 145 mmol/L    Potassium 3.7 3.4 - 5.1 mmol/L    Chloride 102 98 - 107 mmol/L    CO2 30 20 - 30 mmol/L    Anion Gap 7 3 - 16    Glucose 130 (H) 74 - 106 mg/dL    BUN 19 6 - 20 mg/dL    CREATININE 1.0 0.4 - 1.2 mg/dL    GFR Non- 53 (L) >59    GFR African American >59 >59    Calcium 10.3 8.5 - 10.5 mg/dL    Total Protein 7.4 6.4 - 8.3 g/dL    Albumin 4.1 3.4 - 4.8 g/dL    Albumin/Globulin Ratio 1.2 0.8 - 2.0    Total Bilirubin 0.4 0.3 - 1.2 mg/dL    Alkaline Phosphatase 130 (H) 25 - 100 U/L    ALT 15 4 - 36 U/L    AST 19 8 - 33 U/L    Globulin 3.3 g/dL   Lactic Acid, Plasma   Result Value Ref Range    Lactic Acid 1.8 0.4 - 2.0 mmol/L   Troponin   Result Value Ref Range    Troponin <0.30 <0.30 ng/mL   TSH with Reflex   Result Value Ref Range    TSH 2.90 0.27 - 4.20 uIU/mL        All other labs were within normal range or not returned as of this dictation. EMERGENCY DEPARTMENT COURSE and DIFFERENTIAL DIAGNOSIS/MDM:   Vitals:  Vitals:    03/08/21 2115 03/08/21 2130 03/08/21 2150 03/08/21 2300   BP: (!) 174/94 (!) 175/93 (!) 171/89    Pulse: 81  76 76   Resp: 24 24 24 17   Temp:       TempSrc:       SpO2: 97%  96% 94%         The patient has obvious dementia but it's unclear if this is worse than her baseline without a specific reference. Dr. Erika Nunes, her NH doctor requested medical clearance and transfer to [facility] or [facility] for geriatric psych admission. 2052  Called Transfer Center to initiate transfer. Spoke to Brandon Christianson. Patient has some serious polypharmacy issues regarding psychiatric medications. Abilify--second generation anti-psychotic. POTENTIAL DRUG INTERACTIONS WITH ABILIFY  ABILIFY + CYMBALTA  Levels may be increased with coexistent use of cymbalta. This can increase the risk and/or severity of side effects such as drowsiness, seizure, Parkinson-like symptoms, abnormal muscle movements, and low blood pressure.     ABILIFY + SEROQUEL  Using Seroquel together Abilify may increase side effects such as drowsiness, blurred vision, dry mouth, heat intolerance, flushing, decreased sweating, difficulty urinating, abdominal cramping, constipation, irregular heartbeat, confusion, and memory problems. ABILIFY + CELEXA  Citalopram (Celexa) may increase the blood levels of Abilify (ARIPiprazole). This can increase the risk and/or severity of side effects such as drowsiness, seizure, Parkinson-like symptoms, abnormal muscle movements, and low blood pressure. POTENTIAL DRUG INTERACTIONS WITH CYMBALTA  CYMBALTA + SEROQUEL  Using QUEtiapine together with DULoxetine may increase side effects such as dizziness, drowsiness, confusion, and difficulty concentrating. Some people, especially the elderly, may also experience impairment in thinking, judgment, and motor coordination. CYMBALTA + CELEXA  Using citalopram together with DULoxetine can increase the risk of a rare but serious condition called the serotonin syndrome, which may include symptoms such as confusion, hallucination, seizure, extreme changes in blood pressure, increased heart rate, fever, excessive sweating, shivering or shaking, blurred vision, muscle spasm or stiffness, tremor, incoordination, stomach cramp, nausea, vomiting, and diarrhea. Severe cases may result in coma and even death. SEROQUEL AND CELEXA  Using citalopram together with QUEtiapine can increase the risk of an irregular heart rhythm that may be serious and potentially life-threatening, although it is a relatively rare side effect. Should document QTc on EKG. Patient is also on the following. .. Trazodone  Xanax  Gabapentin  All 3 of these can cause drowsiness. I really think this patient needs medication management by a psychiatrist. There are too many drug interactions in this regimen. [phone]  After a prolonged evaluation, [facility] refused to accept the patient stating she did not meet inpatient criteria.     2347  We called No, but they do not have any beds.    Patient has not had any issues while in our ER for the last 5 hours. She will be discharged back to the NH and they can arrange for transfer if they believe it's further necessary as she is medically cleared. NH notified by Fernando Marcial, the nurse. CONSULTS:  None    PROCEDURES:  Procedures    CRITICAL CARE TIME    Total Critical Care time was 0 minutes, excluding separately reportable procedures. There was a high probability of clinically significant/life threatening deterioration in the patient's condition which required my urgent intervention. FINAL IMPRESSION      1. Dementia with behavioral disturbance, unspecified dementia type ([facility] [address]    2. Aggressive behavior    3. Polypharmacy          DISPOSITION/PLAN   DISPOSITION        PATIENT REFERRED TO:  Audra Stewart MD  [address] [facility]  [phone]    Schedule an appointment as soon as possible for a visit in 2 days  As needed      DISCHARGE MEDICATIONS:  New Prescriptions    No medications on file       Comment: Please note this report has been produced using speech recognition software and may contain errorsrelated to that system including errors in grammar, punctuation, and spelling, as well as words and phrases that may be inappropriate. If there are any questions or concerns please feel free to contact the dictating providerfor clarification.     Nan Lewis MD  Attending Emergency Physician                Nan Lewis MD  03/08/21 4914

## 2021-03-09 NOTE — ED NOTES
Call received from [facility], they state that [facility] will not accept the patient because they feel like patient does not need inpatient psych and could be managed outpatient. MD notified and request that access center try [facility] at this time.      Noemy Rayo RN  03/08/21 8990

## 2021-03-09 NOTE — ED NOTES
Adan Sam from [address] [facility] called, she needs [facility] Rehab to fax their nurses notes and copy of POA to [employer], fax # [phone].      Huma Arroyo RN  03/08/21 2134       Huma Arroyo RN  03/08/21 2137

## 2021-03-09 NOTE — ED NOTES
Spoke with Sheryl at [facility], sending pt back because [facility] says  Pt doesn't meet criteria for inpatient. She would like heads up when pt leaves, notified ER of communication.       Gaudencio Palomares RN  03/09/21 0003

## 2021-03-09 NOTE — ED NOTES
Patients daughter states that patient sometimes would not take her medications when she was at home, states that she take them and act like she was taking them and hide them from her, states that she was sneaky about it.      Terri Harris RN  03/08/21 5698

## 2021-03-09 NOTE — ED NOTES
Touched base back with Sheryl to get the bed hold and faxed to facility.       Crista Goodman RN  03/08/21 3438

## 2021-03-09 NOTE — ED NOTES
Call received from madhuri @ [facility], he states that [address] [place] [address] does not have any beds at this time. MD notified. Plans to send patient back to nursing home.      Samantha Balderas RN  03/08/21 8380

## 2021-03-09 NOTE — ED NOTES
Travel screening faxed to [facility] ([phone]) at the request of Yoni Wells RN @ [facility] at this time.      Kelly Bowling RN  03/08/21 1980

## 2021-03-13 LAB — BLOOD CULTURE, ROUTINE: NORMAL

## 2021-04-28 ENCOUNTER — TELEPHONE (OUTPATIENT)
Dept: CARDIOLOGY | Facility: CLINIC | Age: 81
End: 2021-04-28

## 2021-04-28 NOTE — TELEPHONE ENCOUNTER
I spoke with patient's daughter regarding her mom' merlin monitor. Ms Martino was admitted to a nursing home and then to a mental hospital and then back to a nursing home. We will not monitor Ms Martino at this time due to declining health. Her daughter agreed and verbalized understanding.

## 2023-01-27 NOTE — TELEPHONE ENCOUNTER
Daughter requesting an order for a new cpap with supplies be faxed to [phone].  States hers is 13 yrs old and not working properly and due to [place] suggests just ordering another one r/t not being able to go in the homes to check Clindamycin Counseling: I counseled the patient regarding use of clindamycin as an antibiotic for prophylactic and/or therapeutic purposes. Clindamycin is active against numerous classes of bacteria, including skin bacteria. Side effects may include nausea, diarrhea, gastrointestinal upset, rash, hives, yeast infections, and in rare cases, colitis.

## (undated) DEVICE — MEDI-VAC YANKAUER SUCTION HANDLE W/BULBOUS TIP: Brand: CARDINAL HEALTH

## (undated) DEVICE — DRSNG TELFA PAD NONADH STR 1S 3X8IN

## (undated) DEVICE — FORCEP BX 2.2 MMX240 CM CHANNEL SERRATED RADIAL JAW 4

## (undated) DEVICE — CANN NASL CO2 DIVIDED A/

## (undated) DEVICE — ST INF PRI SMRTSTE 20DRP 2VLV 24ML 117

## (undated) DEVICE — SET PRIMARY GRVTY 10DP MALE LL 104IN

## (undated) DEVICE — LIMB HOLDER, WRIST/ANKLE: Brand: DEROYAL

## (undated) DEVICE — LEX ELECTRO PHYSIOLOGY: Brand: MEDLINE INDUSTRIES, INC.

## (undated) DEVICE — SKIN PREP TRAY W/CHG: Brand: MEDLINE INDUSTRIES, INC.

## (undated) DEVICE — PLASMABLADE PS210-030S 3.0S LOCK: Brand: PLASMABLADE™

## (undated) DEVICE — ADULT, W/LG. BACK PAD, RADIOTRANSPARENT ELEMENT AND LEAD WIRE: Brand: DEFIBRILLATION ELECTRODES

## (undated) DEVICE — ST EXT IV SMARTSITE 2VLV SP M LL 5ML IV1

## (undated) DEVICE — DRSNG SURESITE123 4X4.8IN

## (undated) DEVICE — TUBING, SUCTION, 1/4" X 10', STRAIGHT: Brand: MEDLINE